# Patient Record
Sex: FEMALE | Race: WHITE | NOT HISPANIC OR LATINO | Employment: OTHER | URBAN - METROPOLITAN AREA
[De-identification: names, ages, dates, MRNs, and addresses within clinical notes are randomized per-mention and may not be internally consistent; named-entity substitution may affect disease eponyms.]

---

## 2017-03-21 ENCOUNTER — HOSPITAL ENCOUNTER (OUTPATIENT)
Dept: RADIOLOGY | Facility: MEDICAL CENTER | Age: 67
End: 2017-03-21
Attending: FAMILY MEDICINE
Payer: MEDICARE

## 2017-03-21 DIAGNOSIS — Z12.31 SCREENING MAMMOGRAM, ENCOUNTER FOR: ICD-10-CM

## 2017-03-21 PROCEDURE — 77063 BREAST TOMOSYNTHESIS BI: CPT

## 2017-03-23 ENCOUNTER — HOSPITAL ENCOUNTER (OUTPATIENT)
Dept: LAB | Facility: MEDICAL CENTER | Age: 67
End: 2017-03-23
Attending: FAMILY MEDICINE
Payer: MEDICARE

## 2017-03-23 LAB
ALBUMIN SERPL BCP-MCNC: 4.2 G/DL (ref 3.2–4.9)
ALBUMIN/GLOB SERPL: 1.4 G/DL
ALP SERPL-CCNC: 64 U/L (ref 30–99)
ALT SERPL-CCNC: 15 U/L (ref 2–50)
ANION GAP SERPL CALC-SCNC: 9 MMOL/L (ref 0–11.9)
APPEARANCE UR: CLEAR
AST SERPL-CCNC: 21 U/L (ref 12–45)
BASOPHILS # BLD AUTO: 0.08 K/UL (ref 0–0.12)
BASOPHILS NFR BLD AUTO: 1.1 % (ref 0–1.8)
BILIRUB SERPL-MCNC: 0.5 MG/DL (ref 0.1–1.5)
BILIRUB UR QL STRIP.AUTO: NEGATIVE
BUN SERPL-MCNC: 27 MG/DL (ref 8–22)
CALCIUM SERPL-MCNC: 9.8 MG/DL (ref 8.5–10.5)
CHLORIDE SERPL-SCNC: 107 MMOL/L (ref 96–112)
CHOLEST SERPL-MCNC: 159 MG/DL (ref 100–199)
CO2 SERPL-SCNC: 22 MMOL/L (ref 20–33)
COLOR UR AUTO: NORMAL
CREAT SERPL-MCNC: 0.88 MG/DL (ref 0.5–1.4)
EOSINOPHIL # BLD: 0.45 K/UL (ref 0–0.51)
EOSINOPHIL NFR BLD AUTO: 6.2 % (ref 0–6.9)
ERYTHROCYTE [DISTWIDTH] IN BLOOD BY AUTOMATED COUNT: 48.4 FL (ref 35.9–50)
EST. AVERAGE GLUCOSE BLD GHB EST-MCNC: 157 MG/DL
GLOBULIN SER CALC-MCNC: 3.1 G/DL (ref 1.9–3.5)
GLUCOSE SERPL-MCNC: 86 MG/DL (ref 65–99)
GLUCOSE UR STRIP.AUTO-MCNC: NEGATIVE MG/DL
HBA1C MFR BLD: 7.1 % (ref 0–5.6)
HCT VFR BLD AUTO: 42.3 % (ref 37–47)
HDLC SERPL-MCNC: 46 MG/DL
HGB BLD-MCNC: 13.4 G/DL (ref 12–16)
IMM GRANULOCYTES # BLD AUTO: 0.04 K/UL (ref 0–0.11)
IMM GRANULOCYTES NFR BLD AUTO: 0.5 % (ref 0–0.9)
KETONES UR STRIP.AUTO-MCNC: NEGATIVE MG/DL
LDLC SERPL CALC-MCNC: 85 MG/DL
LEUKOCYTE ESTERASE UR QL STRIP.AUTO: NEGATIVE
LYMPHOCYTES # BLD: 1.94 K/UL (ref 1–4.8)
LYMPHOCYTES NFR BLD AUTO: 26.6 % (ref 22–41)
MCH RBC QN AUTO: 29.5 PG (ref 27–33)
MCHC RBC AUTO-ENTMCNC: 31.7 G/DL (ref 33.6–35)
MCV RBC AUTO: 93 FL (ref 81.4–97.8)
MICRO URNS: NORMAL
MONOCYTES # BLD: 0.51 K/UL (ref 0–0.85)
MONOCYTES NFR BLD AUTO: 7 % (ref 0–13.4)
NEUTROPHILS # BLD: 4.28 K/UL (ref 2–7.15)
NEUTROPHILS NFR BLD AUTO: 58.6 % (ref 44–72)
NITRITE UR QL STRIP.AUTO: NEGATIVE
NRBC # BLD AUTO: 0 K/UL
NRBC BLD-RTO: 0 /100 WBC
PH UR: 5.5 [PH]
PLATELET # BLD AUTO: 304 K/UL (ref 164–446)
PMV BLD AUTO: 10.4 FL (ref 9–12.9)
POTASSIUM SERPL-SCNC: 4.1 MMOL/L (ref 3.6–5.5)
PROT SERPL-MCNC: 7.3 G/DL (ref 6–8.2)
PROT UR QL STRIP: NEGATIVE MG/DL
RBC # BLD AUTO: 4.55 M/UL (ref 4.2–5.4)
RBC UR QL AUTO: NEGATIVE
SODIUM SERPL-SCNC: 138 MMOL/L (ref 135–145)
SP GR UR STRIP.AUTO: 1.01
T4 FREE SERPL-MCNC: 0.81 NG/DL (ref 0.53–1.43)
TRIGL SERPL-MCNC: 142 MG/DL (ref 0–149)
TSH SERPL DL<=0.005 MIU/L-ACNC: 3.12 UIU/ML (ref 0.3–3.7)
WBC # BLD AUTO: 7.3 K/UL (ref 4.8–10.8)

## 2017-03-23 PROCEDURE — 85025 COMPLETE CBC W/AUTO DIFF WBC: CPT

## 2017-03-23 PROCEDURE — 83036 HEMOGLOBIN GLYCOSYLATED A1C: CPT | Mod: GA

## 2017-03-23 PROCEDURE — 80061 LIPID PANEL: CPT

## 2017-03-23 PROCEDURE — 84439 ASSAY OF FREE THYROXINE: CPT

## 2017-03-23 PROCEDURE — 81003 URINALYSIS AUTO W/O SCOPE: CPT

## 2017-03-23 PROCEDURE — 84443 ASSAY THYROID STIM HORMONE: CPT

## 2017-03-23 PROCEDURE — 80053 COMPREHEN METABOLIC PANEL: CPT

## 2017-03-23 PROCEDURE — 36415 COLL VENOUS BLD VENIPUNCTURE: CPT

## 2017-12-10 ENCOUNTER — APPOINTMENT (OUTPATIENT)
Dept: RADIOLOGY | Facility: MEDICAL CENTER | Age: 67
End: 2017-12-10
Attending: EMERGENCY MEDICINE
Payer: MEDICARE

## 2017-12-10 ENCOUNTER — HOSPITAL ENCOUNTER (EMERGENCY)
Facility: MEDICAL CENTER | Age: 67
End: 2017-12-10
Attending: EMERGENCY MEDICINE
Payer: MEDICARE

## 2017-12-10 VITALS
WEIGHT: 230 LBS | TEMPERATURE: 97.3 F | BODY MASS INDEX: 31.15 KG/M2 | HEIGHT: 72 IN | DIASTOLIC BLOOD PRESSURE: 75 MMHG | OXYGEN SATURATION: 100 % | HEART RATE: 75 BPM | SYSTOLIC BLOOD PRESSURE: 140 MMHG | RESPIRATION RATE: 16 BRPM

## 2017-12-10 DIAGNOSIS — R42 VERTIGO: ICD-10-CM

## 2017-12-10 LAB
ALBUMIN SERPL BCP-MCNC: 4.2 G/DL (ref 3.2–4.9)
ALBUMIN/GLOB SERPL: 1.3 G/DL
ALP SERPL-CCNC: 61 U/L (ref 30–99)
ALT SERPL-CCNC: 11 U/L (ref 2–50)
ANION GAP SERPL CALC-SCNC: 10 MMOL/L (ref 0–11.9)
APTT PPP: 25.1 SEC (ref 24.7–36)
AST SERPL-CCNC: 17 U/L (ref 12–45)
BASOPHILS # BLD AUTO: 0.8 % (ref 0–1.8)
BASOPHILS # BLD: 0.06 K/UL (ref 0–0.12)
BILIRUB SERPL-MCNC: 0.8 MG/DL (ref 0.1–1.5)
BUN SERPL-MCNC: 21 MG/DL (ref 8–22)
CALCIUM SERPL-MCNC: 9.3 MG/DL (ref 8.4–10.2)
CHLORIDE SERPL-SCNC: 107 MMOL/L (ref 96–112)
CO2 SERPL-SCNC: 21 MMOL/L (ref 20–33)
CREAT SERPL-MCNC: 0.99 MG/DL (ref 0.5–1.4)
EOSINOPHIL # BLD AUTO: 0.16 K/UL (ref 0–0.51)
EOSINOPHIL NFR BLD: 2 % (ref 0–6.9)
ERYTHROCYTE [DISTWIDTH] IN BLOOD BY AUTOMATED COUNT: 45.1 FL (ref 35.9–50)
GFR SERPL CREATININE-BSD FRML MDRD: 56 ML/MIN/1.73 M 2
GLOBULIN SER CALC-MCNC: 3.2 G/DL (ref 1.9–3.5)
GLUCOSE SERPL-MCNC: 229 MG/DL (ref 65–99)
HCT VFR BLD AUTO: 40.5 % (ref 37–47)
HGB BLD-MCNC: 13.6 G/DL (ref 12–16)
IMM GRANULOCYTES # BLD AUTO: 0.03 K/UL (ref 0–0.11)
IMM GRANULOCYTES NFR BLD AUTO: 0.4 % (ref 0–0.9)
INR PPP: 0.96 (ref 0.87–1.13)
LYMPHOCYTES # BLD AUTO: 1.83 K/UL (ref 1–4.8)
LYMPHOCYTES NFR BLD: 23.4 % (ref 22–41)
MCH RBC QN AUTO: 30.1 PG (ref 27–33)
MCHC RBC AUTO-ENTMCNC: 33.6 G/DL (ref 33.6–35)
MCV RBC AUTO: 89.6 FL (ref 81.4–97.8)
MONOCYTES # BLD AUTO: 0.41 K/UL (ref 0–0.85)
MONOCYTES NFR BLD AUTO: 5.2 % (ref 0–13.4)
NEUTROPHILS # BLD AUTO: 5.34 K/UL (ref 2–7.15)
NEUTROPHILS NFR BLD: 68.2 % (ref 44–72)
NRBC # BLD AUTO: 0 K/UL
NRBC BLD AUTO-RTO: 0 /100 WBC
PLATELET # BLD AUTO: 260 K/UL (ref 164–446)
PMV BLD AUTO: 9.6 FL (ref 9–12.9)
POTASSIUM SERPL-SCNC: 4.1 MMOL/L (ref 3.6–5.5)
PROT SERPL-MCNC: 7.4 G/DL (ref 6–8.2)
PROTHROMBIN TIME: 12.4 SEC (ref 12–14.6)
RBC # BLD AUTO: 4.52 M/UL (ref 4.2–5.4)
SODIUM SERPL-SCNC: 138 MMOL/L (ref 135–145)
T4 FREE SERPL-MCNC: 1.05 NG/DL (ref 0.58–1.64)
TSH SERPL DL<=0.005 MIU/L-ACNC: 2.88 UIU/ML (ref 0.35–5.5)
WBC # BLD AUTO: 7.8 K/UL (ref 4.8–10.8)

## 2017-12-10 PROCEDURE — 85025 COMPLETE CBC W/AUTO DIFF WBC: CPT

## 2017-12-10 PROCEDURE — 84439 ASSAY OF FREE THYROXINE: CPT

## 2017-12-10 PROCEDURE — 96374 THER/PROPH/DIAG INJ IV PUSH: CPT

## 2017-12-10 PROCEDURE — 70553 MRI BRAIN STEM W/O & W/DYE: CPT

## 2017-12-10 PROCEDURE — 80053 COMPREHEN METABOLIC PANEL: CPT

## 2017-12-10 PROCEDURE — 99284 EMERGENCY DEPT VISIT MOD MDM: CPT

## 2017-12-10 PROCEDURE — 700111 HCHG RX REV CODE 636 W/ 250 OVERRIDE (IP): Performed by: EMERGENCY MEDICINE

## 2017-12-10 PROCEDURE — 96375 TX/PRO/DX INJ NEW DRUG ADDON: CPT

## 2017-12-10 PROCEDURE — 85730 THROMBOPLASTIN TIME PARTIAL: CPT

## 2017-12-10 PROCEDURE — 700117 HCHG RX CONTRAST REV CODE 255: Performed by: EMERGENCY MEDICINE

## 2017-12-10 PROCEDURE — 85610 PROTHROMBIN TIME: CPT

## 2017-12-10 PROCEDURE — 84443 ASSAY THYROID STIM HORMONE: CPT

## 2017-12-10 PROCEDURE — A9577 INJ MULTIHANCE: HCPCS | Performed by: EMERGENCY MEDICINE

## 2017-12-10 RX ORDER — DIAZEPAM 2 MG/1
2 TABLET ORAL EVERY 6 HOURS PRN
Qty: 20 TAB | Refills: 0 | Status: SHIPPED | OUTPATIENT
Start: 2017-12-10 | End: 2021-04-12

## 2017-12-10 RX ORDER — AZITHROMYCIN 250 MG/1
250-500 TABLET, FILM COATED ORAL DAILY
Status: SHIPPED | COMMUNITY
Start: 2017-12-08 | End: 2021-04-12

## 2017-12-10 RX ORDER — IBUPROFEN 200 MG
400 TABLET ORAL EVERY 6 HOURS PRN
Status: SHIPPED | COMMUNITY
End: 2021-04-12

## 2017-12-10 RX ORDER — GLIPIZIDE 10 MG/1
10 TABLET ORAL 2 TIMES DAILY
COMMUNITY

## 2017-12-10 RX ORDER — LEVOTHYROXINE SODIUM 0.1 MG/1
100 TABLET ORAL
COMMUNITY

## 2017-12-10 RX ORDER — ONDANSETRON 2 MG/ML
4 INJECTION INTRAMUSCULAR; INTRAVENOUS ONCE
Status: COMPLETED | OUTPATIENT
Start: 2017-12-10 | End: 2017-12-10

## 2017-12-10 RX ORDER — LORAZEPAM 2 MG/ML
0.5 INJECTION INTRAMUSCULAR ONCE
Status: COMPLETED | OUTPATIENT
Start: 2017-12-10 | End: 2017-12-10

## 2017-12-10 RX ORDER — MECLIZINE HYDROCHLORIDE 25 MG/1
25 TABLET ORAL 4 TIMES DAILY
Qty: 30 TAB | Refills: 0 | Status: SHIPPED | OUTPATIENT
Start: 2017-12-10 | End: 2021-04-12

## 2017-12-10 RX ORDER — INSULIN GLARGINE 100 [IU]/ML
30 INJECTION, SOLUTION SUBCUTANEOUS NIGHTLY
COMMUNITY

## 2017-12-10 RX ORDER — HYDROCHLOROTHIAZIDE 25 MG/1
12.5 TABLET ORAL
Status: SHIPPED | COMMUNITY
End: 2021-04-12

## 2017-12-10 RX ADMIN — LORAZEPAM 0.5 MG: 2 INJECTION INTRAMUSCULAR; INTRAVENOUS at 10:55

## 2017-12-10 RX ADMIN — ONDANSETRON 4 MG: 2 INJECTION INTRAMUSCULAR; INTRAVENOUS at 10:55

## 2017-12-10 RX ADMIN — GADOBENATE DIMEGLUMINE 10 ML: 529 INJECTION, SOLUTION INTRAVENOUS at 12:39

## 2017-12-10 ASSESSMENT — PAIN SCALES - GENERAL: PAINLEVEL_OUTOF10: 0

## 2017-12-10 NOTE — DISCHARGE INSTRUCTIONS
Benign Positional Vertigo  Vertigo means you feel like you or your surroundings are moving when they are not. Benign positional vertigo is the most common form of vertigo. Benign means that the cause of your condition is not serious. Benign positional vertigo is more common in older adults.  CAUSES   Benign positional vertigo is the result of an upset in the labyrinth system. This is an area in the middle ear that helps control your balance. This may be caused by a viral infection, head injury, or repetitive motion. However, often no specific cause is found.  SYMPTOMS   Symptoms of benign positional vertigo occur when you move your head or eyes in different directions. Some of the symptoms may include:  · Loss of balance and falls.  · Vomiting.  · Blurred vision.  · Dizziness.  · Nausea.  · Involuntary eye movements (nystagmus).  DIAGNOSIS   Benign positional vertigo is usually diagnosed by physical exam. If the specific cause of your benign positional vertigo is unknown, your caregiver may perform imaging tests, such as magnetic resonance imaging (MRI) or computed tomography (CT).  TREATMENT   Your caregiver may recommend movements or procedures to correct the benign positional vertigo. Medicines such as meclizine, benzodiazepines, and medicines for nausea may be used to treat your symptoms. In rare cases, if your symptoms are caused by certain conditions that affect the inner ear, you may need surgery.  HOME CARE INSTRUCTIONS   · Follow your caregiver's instructions.  · Move slowly. Do not make sudden body or head movements.  · Avoid driving.  · Avoid operating heavy machinery.  · Avoid performing any tasks that would be dangerous to you or others during a vertigo episode.  · Drink enough fluids to keep your urine clear or pale yellow.  SEEK IMMEDIATE MEDICAL CARE IF:   · You develop problems with walking, weakness, numbness, or using your arms, hands, or legs.  · You have difficulty speaking.  · You develop  severe headaches.  · Your nausea or vomiting continues or gets worse.  · You develop visual changes.  · Your family or friends notice any behavioral changes.  · Your condition gets worse.  · You have a fever.  · You develop a stiff neck or sensitivity to light.  MAKE SURE YOU:   · Understand these instructions.  · Will watch your condition.  · Will get help right away if you are not doing well or get worse.     This information is not intended to replace advice given to you by your health care provider. Make sure you discuss any questions you have with your health care provider.     Document Released: 09/25/2007 Document Revised: 03/11/2013 Document Reviewed: 04/11/2016  [employer] Patient Education ©2016 [employer]

## 2017-12-10 NOTE — ED PROVIDER NOTES
ED Provider Note    CHIEF COMPLAINT  Chief Complaint   Patient presents with   • Dizziness   • Ear Pain     left       HPI  Rosetta Larry is a 67 y.o. female who presentsFor evaluation of vertigo. The patient is here with her . Over the past 2 weeks she's been having intermittent episodes of vertigo. She was seen in [place] and had laboratory and CT which she states were all normal. Yesterday she was feeling quite a bit better. She has been taking Antivert. Last night she had return of her vertiginous symptoms that have not subsided. She states nothing seems to make them better or worse. She has not had any falls but states that she's had difficulty walking due to the vertigo. She has no numbness or weakness. She is also complaining of left ear pain which seems to be new. She's had no trauma. She's had no drainage. She's had no fevers. She states that she is also now having shaking of her hands.    REVIEW OF SYSTEMS  See HPI for further details. All other systems are negative.     PAST MEDICAL HISTORY  Past Medical History:   Diagnosis Date   • CATARACT    • Diabetes    • Glaucoma     possible   • HTN    • Idiopathic angioedema    • Kidney stone    • Thyroid disease     hypothyroid       FAMILY HISTORY  Family History   Problem Relation Age of Onset   • Diabetes     • Heart Disease     • Cancer     • Hypertension         SOCIAL HISTORY  Social History     Social History   • Marital status:      Spouse name: N/A   • Number of children: N/A   • Years of education: N/A     Social History Main Topics   • Smoking status: Former Smoker     Quit date: 1/1/1970   • Smokeless tobacco: Not on file      Comment: 40 years ago   • Alcohol use No      Comment: rare   • Drug use: No   • Sexual activity: Not on file     Other Topics Concern   • Not on file     Social History Narrative   • No narrative on file       SURGICAL HISTORY  Past Surgical History:   Procedure Laterality Date   • INCISION AND DRAINAGE  GENERAL  1/18/2012    Performed by DL AMARAL at SURGERY [facility]S   • MAMMOPLASTY REDUCTION  9/27/2011    Performed by DL AMARAL at SURGERY [facility] ORS   • CATARACT PHACO WITH IOL  2/23/2011    Performed by CHAKA ANAYA at SURGERY SAME DAY [facility] ORS   • CATARACT PHACO WITH IOL  2/9/2011    Performed by CHAKA ANAYA at SURGERY SAME DAY [facility] ORS   • OTHER  2004    right chest area debridement   • ABDOMINAL HYSTERECTOMY TOTAL     • ACHILLES TENDON REPAIR     • APPENDECTOMY     • LUMPECTOMY      left breast       CURRENT MEDICATIONS  Home Medications     Reviewed by Yelitza Smith R.N. (Registered Nurse) on 12/10/17 at [place]  Med List Status: Complete   Medication Last Dose Status   aspirin (ASA) 325 MG TABS 12/9/2017 Active   atorvastatin (LIPITOR) 10 MG TABS 12/9/2017 Active   AVAPRO PO 12/10/2017 Active   cyanocobalamine/fa/pyridoxine (FOLGARD RX 2.2, FOLCAPS) 2.2-25-0.5 MG Tab 12/9/2017 Active   GLIPIZIDE 12/10/2017 Active   hydrochlorothiazide (HYDRODIURIL) 25 MG Tab  Active   hydrocodone-acetaminophen (VICODIN) 5-500 MG TABS 1/16/2012 Active   LANTUS SC 12/9/2017 Active   latanoprost (XALATAN) 0.005 % SOLN 12/9/2017 Active   LEVOTHYROXINE SODIUM 12/10/2017 Active   NON SPECIFIED 12/9/2017 Active                ALLERGIES  Allergies   Allergen Reactions   • Percocet [Oxycodone-Acetaminophen]      Full body hives   • Keflex    • Lovaza [Omega-3-Acid Ethyl Esters] Diarrhea and Itching   • Naproxen    • Pcn [Penicillins]    • Travatan [Travoprost] Itching and Swelling       PHYSICAL EXAM  VITAL SIGNS: /75   Pulse 80   Temp 36.3 °C (97.3 °F)   Resp 16   Ht 1.829 m (6')   Wt 104.3 kg (230 lb)   SpO2 96%   BMI 31.19 kg/m²     Constitutional: Well developed, Well nourished,Quite anxious, Non-toxic appearance.   HENT: Normocephalic, Atraumatic. TMs are clear bilaterally. Oropharynx is clear.  Eyes: PERRL, EOMI, Conjunctiva normal, No discharge.   Neck: Normal range of  motion.  Cardiovascular: Normal heart rate, Normal rhythm, No murmurs, No rubs, No gallops.   Thorax & Lungs: Lungs clear to auscultation bilaterally without wheezes, rales or rhonchi. No respiratory distress.   Abdomen:  Soft, No tenderness, No masses, No pulsatile masses. No guarding and no rebound.  Skin: Warm, Dry.   Extremities:  No edema, No cyanosis. No calf tenderness or swelling.  Musculoskeletal: Good range of motion in all major joints.  Neurologic: Awake alert and oriented x 3. Cranial nerves II through XII are intact. Normal motor function, No focal deficits noted. She does appear to have bilateral hand tremors.      RADIOLOGY/PROCEDURES  MR-BRAIN-WITH & W/O   Final Result      MRI brain with contrast within normal limits            COURSE & MEDICAL DECISION MAKING  Pertinent Labs & Imaging studies reviewed. (See chart for details)  This is a 67-year-old here for evaluation of vertigo. She appears neurologically intact on exam. She is quite anxious and appears to be somewhat tremulous. Laboratories are obtained to include thyroid function studies which are normal. Chemistries are normal with the exception of a glucose of 229. INR is normal. CBC is normal. The patient is treated with Ativan here with improvement in her symptoms. An MRI of the brain with and without contrast is obtained. This is interpreted by the radiologist as being a normal study. Upon repeat evaluation the patient is resting comfortably. I discussed results of all the studies with the patient and her . Based on the history of somewhat intermittent episodes I suspect this is vertigo secondary to an endolymph stone. I will provide the patient with a prescription for Antivert as well as Valium. She will contact her ear nose throat doctor tomorrow I also requested she follow up with her primary care provider. They're given a discharge instruction sheet on benign positional vertigo. She is discharged home in stable condition in the  care of her .    FINAL IMPRESSION  1. Benign positional vertigo  2.   3.         Electronically signed by: Malvin Lowery, 12/10/2017 10:24 AM

## 2017-12-10 NOTE — ED NOTES
Iv placed ,  Labs drawn by lab . Pt medicated as directed by CARMEN carlin, poc update given to pt. Further orders and dispo pending at this time. No further questions from pt at this time

## 2017-12-10 NOTE — ED NOTES
Several episode of dizziness in the last 2 weeks, worked up at [facility] where she was told everything was normal, dx Vertigo. She has been taking Antivert. Here today for continued dizziness and shaking.

## 2018-04-09 ENCOUNTER — HOSPITAL ENCOUNTER (OUTPATIENT)
Dept: RADIOLOGY | Facility: MEDICAL CENTER | Age: 68
End: 2018-04-09
Attending: FAMILY MEDICINE
Payer: MEDICARE

## 2018-04-09 DIAGNOSIS — Z12.31 SCREENING MAMMOGRAM, ENCOUNTER FOR: ICD-10-CM

## 2018-04-09 PROCEDURE — 77067 SCR MAMMO BI INCL CAD: CPT

## 2018-06-22 ENCOUNTER — HOSPITAL ENCOUNTER (OUTPATIENT)
Dept: LAB | Facility: MEDICAL CENTER | Age: 68
End: 2018-06-22
Attending: FAMILY MEDICINE
Payer: MEDICARE

## 2018-06-22 LAB
ALBUMIN SERPL BCP-MCNC: 4.3 G/DL (ref 3.2–4.9)
ALBUMIN/GLOB SERPL: 1.5 G/DL
ALP SERPL-CCNC: 57 U/L (ref 30–99)
ALT SERPL-CCNC: 9 U/L (ref 2–50)
ANION GAP SERPL CALC-SCNC: 10 MMOL/L (ref 0–11.9)
AST SERPL-CCNC: 18 U/L (ref 12–45)
BASOPHILS # BLD AUTO: 0.7 % (ref 0–1.8)
BASOPHILS # BLD: 0.07 K/UL (ref 0–0.12)
BILIRUB SERPL-MCNC: 0.5 MG/DL (ref 0.1–1.5)
BUN SERPL-MCNC: 21 MG/DL (ref 8–22)
CALCIUM SERPL-MCNC: 9.2 MG/DL (ref 8.5–10.5)
CHLORIDE SERPL-SCNC: 108 MMOL/L (ref 96–112)
CHOLEST SERPL-MCNC: 129 MG/DL (ref 100–199)
CO2 SERPL-SCNC: 21 MMOL/L (ref 20–33)
CREAT SERPL-MCNC: 0.87 MG/DL (ref 0.5–1.4)
EOSINOPHIL # BLD AUTO: 0.19 K/UL (ref 0–0.51)
EOSINOPHIL NFR BLD: 1.8 % (ref 0–6.9)
ERYTHROCYTE [DISTWIDTH] IN BLOOD BY AUTOMATED COUNT: 48.5 FL (ref 35.9–50)
ERYTHROCYTE [SEDIMENTATION RATE] IN BLOOD BY WESTERGREN METHOD: 28 MM/HOUR (ref 0–30)
GLOBULIN SER CALC-MCNC: 2.9 G/DL (ref 1.9–3.5)
GLUCOSE SERPL-MCNC: 78 MG/DL (ref 65–99)
HCT VFR BLD AUTO: 41.5 % (ref 37–47)
HDLC SERPL-MCNC: 43 MG/DL
HGB BLD-MCNC: 13.7 G/DL (ref 12–16)
IMM GRANULOCYTES # BLD AUTO: 0.04 K/UL (ref 0–0.11)
IMM GRANULOCYTES NFR BLD AUTO: 0.4 % (ref 0–0.9)
LDLC SERPL CALC-MCNC: 57 MG/DL
LYMPHOCYTES # BLD AUTO: 1.81 K/UL (ref 1–4.8)
LYMPHOCYTES NFR BLD: 17.5 % (ref 22–41)
MCH RBC QN AUTO: 30.2 PG (ref 27–33)
MCHC RBC AUTO-ENTMCNC: 33 G/DL (ref 33.6–35)
MCV RBC AUTO: 91.6 FL (ref 81.4–97.8)
MONOCYTES # BLD AUTO: 0.53 K/UL (ref 0–0.85)
MONOCYTES NFR BLD AUTO: 5.1 % (ref 0–13.4)
NEUTROPHILS # BLD AUTO: 7.7 K/UL (ref 2–7.15)
NEUTROPHILS NFR BLD: 74.5 % (ref 44–72)
NRBC # BLD AUTO: 0 K/UL
NRBC BLD-RTO: 0 /100 WBC
PLATELET # BLD AUTO: 274 K/UL (ref 164–446)
PMV BLD AUTO: 10.5 FL (ref 9–12.9)
POTASSIUM SERPL-SCNC: 3.6 MMOL/L (ref 3.6–5.5)
PROT SERPL-MCNC: 7.2 G/DL (ref 6–8.2)
RBC # BLD AUTO: 4.53 M/UL (ref 4.2–5.4)
SODIUM SERPL-SCNC: 139 MMOL/L (ref 135–145)
TRIGL SERPL-MCNC: 144 MG/DL (ref 0–149)
URATE SERPL-MCNC: 7.7 MG/DL (ref 1.9–8.2)
WBC # BLD AUTO: 10.3 K/UL (ref 4.8–10.8)

## 2018-06-22 PROCEDURE — 85025 COMPLETE CBC W/AUTO DIFF WBC: CPT

## 2018-06-22 PROCEDURE — 36415 COLL VENOUS BLD VENIPUNCTURE: CPT

## 2018-06-22 PROCEDURE — 84550 ASSAY OF BLOOD/URIC ACID: CPT

## 2018-06-22 PROCEDURE — 85652 RBC SED RATE AUTOMATED: CPT

## 2018-06-22 PROCEDURE — 80061 LIPID PANEL: CPT

## 2018-06-22 PROCEDURE — 80053 COMPREHEN METABOLIC PANEL: CPT

## 2019-04-22 ENCOUNTER — OFFICE VISIT (OUTPATIENT)
Dept: URGENT CARE | Facility: PHYSICIAN GROUP | Age: 69
End: 2019-04-22
Payer: MEDICARE

## 2019-04-22 VITALS
BODY MASS INDEX: 31.15 KG/M2 | DIASTOLIC BLOOD PRESSURE: 82 MMHG | HEART RATE: 86 BPM | HEIGHT: 72 IN | SYSTOLIC BLOOD PRESSURE: 118 MMHG | TEMPERATURE: 97.9 F | WEIGHT: 230 LBS | OXYGEN SATURATION: 98 %

## 2019-04-22 DIAGNOSIS — L02.91 ABSCESS: ICD-10-CM

## 2019-04-22 PROCEDURE — 99204 OFFICE O/P NEW MOD 45 MIN: CPT | Performed by: FAMILY MEDICINE

## 2019-04-22 RX ORDER — SULFAMETHOXAZOLE AND TRIMETHOPRIM 800; 160 MG/1; MG/1
1 TABLET ORAL 2 TIMES DAILY
Qty: 20 TAB | Refills: 0 | Status: SHIPPED | OUTPATIENT
Start: 2019-04-22 | End: 2019-05-02

## 2019-04-22 ASSESSMENT — ENCOUNTER SYMPTOMS
DIZZINESS: 0
NAUSEA: 0
VOMITING: 0
MYALGIAS: 0
CHILLS: 0
SORE THROAT: 0
NUMBNESS: 0
FEVER: 0
EYE PAIN: 0
WEAKNESS: 0
SHORTNESS OF BREATH: 0

## 2019-04-22 NOTE — PROGRESS NOTES
Subjective:     Rosetta Larry is a 69 y.o. female who presents for Finger Pain (x4 days, Swollen R pinky finger, pustle, painful )       Hand Injury   This is a new problem. The current episode started in the past 7 days. The problem occurs constantly. The problem has been gradually worsening. Pertinent negatives include no chest pain, chills, fever, myalgias, nausea, numbness, rash, sore throat, vomiting or weakness.     Past Medical History:   Diagnosis Date   • CATARACT    • Diabetes    • Glaucoma     possible   • HTN    • Idiopathic angioedema    • Kidney stone    • Thyroid disease     hypothyroid     Past Surgical History:   Procedure Laterality Date   • INCISION AND DRAINAGE GENERAL  1/18/2012    Performed by DL AMARAL at SURGERY [facility] ORS   • MAMMOPLASTY REDUCTION  9/27/2011    Performed by DL AMARAL at SURGERY [facility] ORS   • CATARACT PHACO WITH IOL  2/23/2011    Performed by CHAKA ANAYA at SURGERY SAME DAY [facility] ORS   • CATARACT PHACO WITH IOL  2/9/2011    Performed by CHAKA ANAYA at SURGERY SAME DAY [facility] ORS   • OTHER  2004    right chest area debridement   • ABDOMINAL HYSTERECTOMY TOTAL     • ACHILLES TENDON REPAIR     • APPENDECTOMY     • LUMPECTOMY      left breast     Social History     Social History   • Marital status:      Spouse name: N/A   • Number of children: N/A   • Years of education: N/A     Occupational History   • Not on file.     Social History Main Topics   • Smoking status: Former Smoker     Quit date: 1/1/1970   • Smokeless tobacco: Never Used      Comment: 40 years ago   • Alcohol use No      Comment: rare   • Drug use: No   • Sexual activity: Not on file     Other Topics Concern   • Not on file     Social History Narrative   • No narrative on file      Family History   Problem Relation Age of Onset   • Diabetes Unknown    • Heart Disease Unknown    • Cancer Unknown    • Hypertension Unknown    • Stroke Neg Hx     Review of  "Systems   Constitutional: Negative for chills and fever.   HENT: Negative for sore throat.    Eyes: Negative for pain.   Respiratory: Negative for shortness of breath.    Cardiovascular: Negative for chest pain.   Gastrointestinal: Negative for nausea and vomiting.   Genitourinary: Negative for hematuria.   Musculoskeletal: Negative for myalgias.   Skin: Negative for rash.   Neurological: Negative for dizziness, weakness and numbness.     Allergies   Allergen Reactions   • Percocet [Oxycodone-Acetaminophen]      Full body hives   • Hydrocodone Itching   • Keflex Rash     \"Body rash\"   • Lovaza [Omega-3-Acid Ethyl Esters] Diarrhea and Itching   • Naproxen Rash     \"Body rash\"   • Pcn [Penicillins] Rash     \"Body rash\"   • Travatan [Travoprost] Itching and Swelling      Objective:   /82 (BP Location: Right arm, Patient Position: Sitting, BP Cuff Size: Adult)   Pulse 86   Temp 36.6 °C (97.9 °F) (Temporal)   Ht 1.829 m (6')   Wt 104.3 kg (230 lb)   SpO2 98%   BMI 31.19 kg/m²   Physical Exam   Constitutional: She is oriented to person, place, and time. She appears well-developed and well-nourished. No distress.   HENT:   Head: Normocephalic and atraumatic.   Eyes: Pupils are equal, round, and reactive to light. Conjunctivae and EOM are normal.   Cardiovascular: Normal rate and regular rhythm.    No murmur heard.  Pulmonary/Chest: Effort normal and breath sounds normal. No respiratory distress.   Abdominal: Soft. She exhibits no distension. There is no tenderness.   Neurological: She is alert and oriented to person, place, and time. She has normal reflexes. No sensory deficit.   Skin: Skin is warm and dry.        Psychiatric: She has a normal mood and affect.         Assessment/Plan:   Assessment    1. Abscess  - sulfamethoxazole-trimethoprim (BACTRIM DS) 800-160 MG tablet; Take 1 Tab by mouth 2 times a day for 10 days.  Dispense: 20 Tab; Refill: 0  Abscess lanced with a 16G needle with purulent material " expressed.   Differential diagnosis, natural history, supportive care, and indications for immediate follow-up discussed.

## 2019-04-23 ENCOUNTER — TELEPHONE (OUTPATIENT)
Dept: URGENT CARE | Facility: PHYSICIAN GROUP | Age: 69
End: 2019-04-23

## 2019-04-24 ENCOUNTER — TELEPHONE (OUTPATIENT)
Dept: URGENT CARE | Facility: PHYSICIAN GROUP | Age: 69
End: 2019-04-24

## 2019-04-24 RX ORDER — LEVOFLOXACIN 500 MG/1
500 TABLET, FILM COATED ORAL DAILY
Qty: 7 TAB | Refills: 0 | Status: SHIPPED | OUTPATIENT
Start: 2019-04-24 | End: 2021-04-12

## 2019-06-26 ENCOUNTER — HOSPITAL ENCOUNTER (OUTPATIENT)
Dept: LAB | Facility: MEDICAL CENTER | Age: 69
End: 2019-06-26
Attending: FAMILY MEDICINE
Payer: MEDICARE

## 2019-06-26 LAB
ALBUMIN SERPL BCP-MCNC: 4.3 G/DL (ref 3.2–4.9)
ALBUMIN/GLOB SERPL: 1.5 G/DL
ALP SERPL-CCNC: 63 U/L (ref 30–99)
ALT SERPL-CCNC: 10 U/L (ref 2–50)
ANION GAP SERPL CALC-SCNC: 11 MMOL/L (ref 0–11.9)
AST SERPL-CCNC: 18 U/L (ref 12–45)
BASOPHILS # BLD AUTO: 0.9 % (ref 0–1.8)
BASOPHILS # BLD: 0.07 K/UL (ref 0–0.12)
BILIRUB SERPL-MCNC: 0.6 MG/DL (ref 0.1–1.5)
BUN SERPL-MCNC: 26 MG/DL (ref 8–22)
CALCIUM SERPL-MCNC: 9.7 MG/DL (ref 8.5–10.5)
CHLORIDE SERPL-SCNC: 107 MMOL/L (ref 96–112)
CHOLEST SERPL-MCNC: 123 MG/DL (ref 100–199)
CO2 SERPL-SCNC: 24 MMOL/L (ref 20–33)
CREAT SERPL-MCNC: 0.87 MG/DL (ref 0.5–1.4)
EOSINOPHIL # BLD AUTO: 0.19 K/UL (ref 0–0.51)
EOSINOPHIL NFR BLD: 2.6 % (ref 0–6.9)
ERYTHROCYTE [DISTWIDTH] IN BLOOD BY AUTOMATED COUNT: 47.7 FL (ref 35.9–50)
EST. AVERAGE GLUCOSE BLD GHB EST-MCNC: 146 MG/DL
GLOBULIN SER CALC-MCNC: 2.8 G/DL (ref 1.9–3.5)
GLUCOSE SERPL-MCNC: 80 MG/DL (ref 65–99)
HBA1C MFR BLD: 6.7 % (ref 0–5.6)
HCT VFR BLD AUTO: 42.9 % (ref 37–47)
HDLC SERPL-MCNC: 44 MG/DL
HGB BLD-MCNC: 13.3 G/DL (ref 12–16)
IMM GRANULOCYTES # BLD AUTO: 0.02 K/UL (ref 0–0.11)
IMM GRANULOCYTES NFR BLD AUTO: 0.3 % (ref 0–0.9)
LDLC SERPL CALC-MCNC: 55 MG/DL
LYMPHOCYTES # BLD AUTO: 2.05 K/UL (ref 1–4.8)
LYMPHOCYTES NFR BLD: 27.6 % (ref 22–41)
MCH RBC QN AUTO: 28.7 PG (ref 27–33)
MCHC RBC AUTO-ENTMCNC: 31 G/DL (ref 33.6–35)
MCV RBC AUTO: 92.7 FL (ref 81.4–97.8)
MONOCYTES # BLD AUTO: 0.4 K/UL (ref 0–0.85)
MONOCYTES NFR BLD AUTO: 5.4 % (ref 0–13.4)
NEUTROPHILS # BLD AUTO: 4.7 K/UL (ref 2–7.15)
NEUTROPHILS NFR BLD: 63.2 % (ref 44–72)
NRBC # BLD AUTO: 0 K/UL
NRBC BLD-RTO: 0 /100 WBC
PLATELET # BLD AUTO: 257 K/UL (ref 164–446)
PMV BLD AUTO: 11.2 FL (ref 9–12.9)
POTASSIUM SERPL-SCNC: 3.8 MMOL/L (ref 3.6–5.5)
PROT SERPL-MCNC: 7.1 G/DL (ref 6–8.2)
RBC # BLD AUTO: 4.63 M/UL (ref 4.2–5.4)
SODIUM SERPL-SCNC: 142 MMOL/L (ref 135–145)
TRIGL SERPL-MCNC: 122 MG/DL (ref 0–149)
TSH SERPL DL<=0.005 MIU/L-ACNC: 3.32 UIU/ML (ref 0.38–5.33)
URATE SERPL-MCNC: 7.6 MG/DL (ref 1.9–8.2)
WBC # BLD AUTO: 7.4 K/UL (ref 4.8–10.8)

## 2019-06-26 PROCEDURE — 85025 COMPLETE CBC W/AUTO DIFF WBC: CPT

## 2019-06-26 PROCEDURE — 83036 HEMOGLOBIN GLYCOSYLATED A1C: CPT | Mod: GA

## 2019-06-26 PROCEDURE — 84443 ASSAY THYROID STIM HORMONE: CPT

## 2019-06-26 PROCEDURE — 36415 COLL VENOUS BLD VENIPUNCTURE: CPT

## 2019-06-26 PROCEDURE — 80061 LIPID PANEL: CPT

## 2019-06-26 PROCEDURE — 80053 COMPREHEN METABOLIC PANEL: CPT

## 2019-06-26 PROCEDURE — 84550 ASSAY OF BLOOD/URIC ACID: CPT

## 2019-10-30 ENCOUNTER — HOSPITAL ENCOUNTER (OUTPATIENT)
Dept: RADIOLOGY | Facility: MEDICAL CENTER | Age: 69
End: 2019-10-30
Attending: FAMILY MEDICINE
Payer: MEDICARE

## 2019-10-30 DIAGNOSIS — Z12.31 VISIT FOR SCREENING MAMMOGRAM: ICD-10-CM

## 2019-10-30 PROCEDURE — 77063 BREAST TOMOSYNTHESIS BI: CPT

## 2019-11-01 ENCOUNTER — HOSPITAL ENCOUNTER (OUTPATIENT)
Dept: RADIOLOGY | Facility: MEDICAL CENTER | Age: 69
End: 2019-11-01
Attending: FAMILY MEDICINE
Payer: MEDICARE

## 2019-11-01 DIAGNOSIS — R92.8 ABNORMAL MAMMOGRAM: ICD-10-CM

## 2019-11-01 PROCEDURE — 76642 ULTRASOUND BREAST LIMITED: CPT | Mod: RT

## 2019-11-01 PROCEDURE — G0279 TOMOSYNTHESIS, MAMMO: HCPCS | Mod: RT

## 2021-01-08 ENCOUNTER — HOSPITAL ENCOUNTER (OUTPATIENT)
Dept: RADIOLOGY | Facility: MEDICAL CENTER | Age: 71
End: 2021-01-08
Attending: FAMILY MEDICINE
Payer: MEDICARE

## 2021-01-08 DIAGNOSIS — N64.4 BREAST PAIN: ICD-10-CM

## 2021-01-08 PROCEDURE — G0279 TOMOSYNTHESIS, MAMMO: HCPCS

## 2021-01-08 PROCEDURE — 76642 ULTRASOUND BREAST LIMITED: CPT | Mod: RT

## 2021-01-15 ENCOUNTER — HOSPITAL ENCOUNTER (OUTPATIENT)
Dept: LAB | Facility: MEDICAL CENTER | Age: 71
End: 2021-01-15
Attending: FAMILY MEDICINE
Payer: MEDICARE

## 2021-01-15 LAB
BASOPHILS # BLD AUTO: 0.7 % (ref 0–1.8)
BASOPHILS # BLD: 0.05 K/UL (ref 0–0.12)
EOSINOPHIL # BLD AUTO: 0.14 K/UL (ref 0–0.51)
EOSINOPHIL NFR BLD: 2 % (ref 0–6.9)
ERYTHROCYTE [DISTWIDTH] IN BLOOD BY AUTOMATED COUNT: 46.3 FL (ref 35.9–50)
HCT VFR BLD AUTO: 44.4 % (ref 37–47)
HGB BLD-MCNC: 14.3 G/DL (ref 12–16)
IMM GRANULOCYTES # BLD AUTO: 0.02 K/UL (ref 0–0.11)
IMM GRANULOCYTES NFR BLD AUTO: 0.3 % (ref 0–0.9)
LYMPHOCYTES # BLD AUTO: 1.73 K/UL (ref 1–4.8)
LYMPHOCYTES NFR BLD: 24.1 % (ref 22–41)
MCH RBC QN AUTO: 30.5 PG (ref 27–33)
MCHC RBC AUTO-ENTMCNC: 32.2 G/DL (ref 33.6–35)
MCV RBC AUTO: 94.7 FL (ref 81.4–97.8)
MONOCYTES # BLD AUTO: 0.37 K/UL (ref 0–0.85)
MONOCYTES NFR BLD AUTO: 5.2 % (ref 0–13.4)
NEUTROPHILS # BLD AUTO: 4.86 K/UL (ref 2–7.15)
NEUTROPHILS NFR BLD: 67.7 % (ref 44–72)
NRBC # BLD AUTO: 0 K/UL
NRBC BLD-RTO: 0 /100 WBC
PLATELET # BLD AUTO: 268 K/UL (ref 164–446)
PMV BLD AUTO: 11 FL (ref 9–12.9)
RBC # BLD AUTO: 4.69 M/UL (ref 4.2–5.4)
WBC # BLD AUTO: 7.2 K/UL (ref 4.8–10.8)

## 2021-01-15 PROCEDURE — 80053 COMPREHEN METABOLIC PANEL: CPT

## 2021-01-15 PROCEDURE — 82306 VITAMIN D 25 HYDROXY: CPT

## 2021-01-15 PROCEDURE — 84550 ASSAY OF BLOOD/URIC ACID: CPT

## 2021-01-15 PROCEDURE — 84481 FREE ASSAY (FT-3): CPT

## 2021-01-15 PROCEDURE — 82043 UR ALBUMIN QUANTITATIVE: CPT

## 2021-01-15 PROCEDURE — 84443 ASSAY THYROID STIM HORMONE: CPT

## 2021-01-15 PROCEDURE — 82570 ASSAY OF URINE CREATININE: CPT

## 2021-01-15 PROCEDURE — 85025 COMPLETE CBC W/AUTO DIFF WBC: CPT

## 2021-01-15 PROCEDURE — 36415 COLL VENOUS BLD VENIPUNCTURE: CPT

## 2021-01-15 PROCEDURE — 80061 LIPID PANEL: CPT

## 2021-01-15 PROCEDURE — 84439 ASSAY OF FREE THYROXINE: CPT

## 2021-01-16 LAB
25(OH)D3 SERPL-MCNC: 23 NG/ML (ref 30–100)
ALBUMIN SERPL BCP-MCNC: 4.4 G/DL (ref 3.2–4.9)
ALBUMIN/GLOB SERPL: 1.7 G/DL
ALP SERPL-CCNC: 87 U/L (ref 30–99)
ALT SERPL-CCNC: 10 U/L (ref 2–50)
ANION GAP SERPL CALC-SCNC: 10 MMOL/L (ref 7–16)
AST SERPL-CCNC: 19 U/L (ref 12–45)
BILIRUB SERPL-MCNC: 0.4 MG/DL (ref 0.1–1.5)
BUN SERPL-MCNC: 15 MG/DL (ref 8–22)
CALCIUM SERPL-MCNC: 9.5 MG/DL (ref 8.5–10.5)
CHLORIDE SERPL-SCNC: 107 MMOL/L (ref 96–112)
CHOLEST SERPL-MCNC: 139 MG/DL (ref 100–199)
CO2 SERPL-SCNC: 22 MMOL/L (ref 20–33)
CREAT SERPL-MCNC: 0.78 MG/DL (ref 0.5–1.4)
CREAT UR-MCNC: 84.86 MG/DL
GLOBULIN SER CALC-MCNC: 2.6 G/DL (ref 1.9–3.5)
GLUCOSE SERPL-MCNC: 162 MG/DL (ref 65–99)
HDLC SERPL-MCNC: 50 MG/DL
LDLC SERPL CALC-MCNC: 66 MG/DL
MICROALBUMIN UR-MCNC: <1.2 MG/DL
MICROALBUMIN/CREAT UR: NORMAL MG/G (ref 0–30)
POTASSIUM SERPL-SCNC: 4.3 MMOL/L (ref 3.6–5.5)
PROT SERPL-MCNC: 7 G/DL (ref 6–8.2)
SODIUM SERPL-SCNC: 139 MMOL/L (ref 135–145)
T3FREE SERPL-MCNC: 2.93 PG/ML (ref 2–4.4)
T4 FREE SERPL-MCNC: 1.52 NG/DL (ref 0.93–1.7)
TRIGL SERPL-MCNC: 114 MG/DL (ref 0–149)
TSH SERPL DL<=0.005 MIU/L-ACNC: 2.36 UIU/ML (ref 0.38–5.33)
URATE SERPL-MCNC: 4.6 MG/DL (ref 1.9–8.2)

## 2021-04-12 ENCOUNTER — HOSPITAL ENCOUNTER (EMERGENCY)
Facility: MEDICAL CENTER | Age: 71
End: 2021-04-12
Attending: EMERGENCY MEDICINE
Payer: MEDICARE

## 2021-04-12 VITALS
HEIGHT: 72 IN | BODY MASS INDEX: 29.59 KG/M2 | HEART RATE: 75 BPM | OXYGEN SATURATION: 95 % | TEMPERATURE: 98 F | DIASTOLIC BLOOD PRESSURE: 62 MMHG | SYSTOLIC BLOOD PRESSURE: 128 MMHG | WEIGHT: 218.48 LBS | RESPIRATION RATE: 18 BRPM

## 2021-04-12 DIAGNOSIS — T78.40XA ALLERGIC REACTION, INITIAL ENCOUNTER: ICD-10-CM

## 2021-04-12 PROCEDURE — 99284 EMERGENCY DEPT VISIT MOD MDM: CPT

## 2021-04-12 PROCEDURE — 700102 HCHG RX REV CODE 250 W/ 637 OVERRIDE(OP): Performed by: EMERGENCY MEDICINE

## 2021-04-12 PROCEDURE — A9270 NON-COVERED ITEM OR SERVICE: HCPCS | Performed by: EMERGENCY MEDICINE

## 2021-04-12 RX ORDER — LANOLIN ALCOHOL/MO/W.PET/CERES
1000 CREAM (GRAM) TOPICAL DAILY
COMMUNITY

## 2021-04-12 RX ORDER — GABAPENTIN 100 MG/1
200 CAPSULE ORAL 3 TIMES DAILY
COMMUNITY

## 2021-04-12 RX ORDER — METHYLPREDNISOLONE 4 MG/1
TABLET ORAL
Qty: 1 EACH | Refills: 0 | Status: SHIPPED | OUTPATIENT
Start: 2021-04-12 | End: 2022-05-03

## 2021-04-12 RX ORDER — ELECTROLYTES/DEXTROSE
100 SOLUTION, ORAL ORAL DAILY
COMMUNITY

## 2021-04-12 RX ORDER — DEXAMETHASONE 4 MG/1
8 TABLET ORAL ONCE
Status: COMPLETED | OUTPATIENT
Start: 2021-04-12 | End: 2021-04-12

## 2021-04-12 RX ORDER — CLINDAMYCIN HYDROCHLORIDE 150 MG/1
150 CAPSULE ORAL 3 TIMES DAILY
Status: SHIPPED | COMMUNITY
End: 2022-05-03

## 2021-04-12 RX ADMIN — DEXAMETHASONE 8 MG: 4 TABLET ORAL at 14:35

## 2021-04-12 ASSESSMENT — FIBROSIS 4 INDEX: FIB4 SCORE: 1.59

## 2021-04-12 NOTE — ED TRIAGE NOTES
"Pt AAO Anxious No dyspnea Mild facial \" puffiness \" No oral mucosal swelling Was taking clindamycin for spider bite posterior LT leg  Actually saw spider  "

## 2021-04-12 NOTE — ED PROVIDER NOTES
ED Provider Note    CHIEF COMPLAINT  Chief Complaint   Patient presents with   • Rash     Allergic Rx to clindamycin Onset Sat  after 5 days  To [facility] Sun On  benadryl Did not fill her pepcid Worsening No SOB   • Facial Swelling     Very mild       HPI  Rosetta Larry is a 71 y.o. female who presents to the emergency department for persistent rash. Past medical history as documented below. Type II diabetic. She had recently seen her primary care physician due to a questionable spider bite on her left thigh. After using some new process she denies any significant change to the lesion and therefore started course of clindamycin. In the last 48 hours of her treatment course she developed a diffuse rash and did not complete her last dosing. Now over the last 48 to 72 hours she is had persistent rash. She got her [facility] yesterday which started her on Benadryl Pepcid but this is not significant change or rash and therefore she decided to come to the emergency department today for further evaluation. She otherwise feels well. No shortness of breath and wheezing. No abdominal pain. No diarrhea. No difficulty breathing or swallowing.    REVIEW OF SYSTEMS  See HPI for further details. All other systems are negative.     PAST MEDICAL HISTORY   has a past medical history of CATARACT, Diabetes, Glaucoma, HTN, Idiopathic angioedema, Kidney stone, and Thyroid disease.    SOCIAL HISTORY  Social History     Tobacco Use   • Smoking status: Former Smoker     Quit date: 1970     Years since quittin.3   • Smokeless tobacco: Never Used   • Tobacco comment: 40 years ago   Substance and Sexual Activity   • Alcohol use: No     Alcohol/week: 0.0 oz     Comment: rare   • Drug use: No   • Sexual activity: Not on file       SURGICAL HISTORY   has a past surgical history that includes appendectomy; lumpectomy; abdominal hysterectomy total; achilles tendon repair; cataract phaco with iol (2011); cataract phaco with iol  "(2/23/2011); other (2004); mammoplasty reduction (9/27/2011); and incision and drainage general (1/18/2012).    CURRENT MEDICATIONS  Home Medications     Reviewed by Esperanza Walsh (Pharmacy Tech) on 04/12/21 at 1413  Med List Status: Complete   Medication Last Dose Status   aspirin EC (ECOTRIN) 81 MG Tablet Delayed Response 4/11/2021 Active   atorvastatin (LIPITOR) 10 MG TABS 4/11/2021 Active   CINNAMON PO 4/12/2021 Active   clindamycin (CLEOCIN) 150 MG Cap 4/10/2021 Active   Cyanocobalamin (VITAMIN B-12) 1000 MCG Tab 4/12/2021 Active   gabapentin (NEURONTIN) 100 MG Cap 4/12/2021 Active   glipiZIDE (GLUCOTROL) 10 MG Tab 4/12/2021 Active   insulin glargine (LANTUS) 100 UNIT/ML Solution 4/11/2021 Active   latanoprost (XALATAN) 0.005 % SOLN 4/11/2021 Active   levothyroxine (SYNTHROID) 100 MCG Tab 4/12/2021 Active   Pyridoxine HCl (VITAMIN B6) 100 MG Tab 4/12/2021 Active                ALLERGIES  Allergies   Allergen Reactions   • Clindamycin Rash and Swelling     facial   • Sulfa Drugs    • Percocet [Oxycodone-Acetaminophen]      Full body hives   • Hydrocodone Itching   • Keflex Rash     \"Body rash\"   • Lovaza [Omega-3-Acid Ethyl Esters] Diarrhea and Itching   • Naproxen Rash     \"Body rash\"   • Pcn [Penicillins] Rash     \"Body rash\"   • Travatan [Travoprost] Itching and Swelling       PHYSICAL EXAM  VITAL SIGNS: BP (!) 163/85   Pulse (!) 101   Temp 36.8 °C (98.2 °F) (Temporal)   Resp 16   Ht 1.829 m (6')   Wt 99.1 kg (218 lb 7.6 oz)   SpO2 97%   BMI 29.63 kg/m²  @GINGER[[id number]::@  Pulse ox interpretation: I interpret this pulse ox as normal.  Constitutional: Alert in no apparent distress.  HENT: Normocephalic, Atraumatic, Bilateral external ears normal. Nose normal.   Eyes: Pupils are equal and reactive.   Heart: Regular rate and rythm, no murmurs.    Lungs: Clear to auscultation bilaterally.  Skin: Warm, Dry, diffuse macular popular rash from head to toe sparing palms and soles mucous " membranes.  Neurologic: Alert, Grossly non-focal.   Psychiatric: Affect normal, Judgment normal, Mood normal, Appears appropriate and not intoxicated.           COURSE & MEDICAL DECISION MAKING  Pertinent Labs & Imaging studies reviewed. (See chart for details)  71-year-old female presented emergency room with allergic reaction and rash. History as above. Overall the patient appears well. She has taken Benadryl Pepcid for the last 24 to 36 hours with no change. The client will started on steroids but she is understand that she will need to monitor her blood sugars carefully and may need additional coverage at night with her Lantus in the evenings. She is additionally understanding return precautions here the ER. She is understand that she should avoid further exposure to this medication as this does seem to be the causative factor. She denies any return precautions here the ER.       The patient will return for worsening symptoms and is stable at the time of discharge. The patient verbalizes understanding and will comply.    FINAL IMPRESSION  1. Allergic reaction, initial encounter               Electronically signed by: José Wyman M.D., 4/12/2021 2:30 PM

## 2021-04-12 NOTE — ED NOTES
Med rec completed per pt   Allergies reviewed    Pt was on a 7 day course of Clindamycin that she stopped on day 5 (4/10/2021) due to a reaction

## 2021-09-01 ENCOUNTER — HOSPITAL ENCOUNTER (OUTPATIENT)
Dept: LAB | Facility: MEDICAL CENTER | Age: 71
End: 2021-09-01
Attending: FAMILY MEDICINE
Payer: MEDICARE

## 2021-09-01 LAB
25(OH)D3 SERPL-MCNC: 18 NG/ML (ref 30–100)
ALBUMIN SERPL BCP-MCNC: 4.3 G/DL (ref 3.2–4.9)
ALBUMIN/GLOB SERPL: 1.5 G/DL
ALP SERPL-CCNC: 86 U/L (ref 30–99)
ALT SERPL-CCNC: 11 U/L (ref 2–50)
ANION GAP SERPL CALC-SCNC: 14 MMOL/L (ref 7–16)
AST SERPL-CCNC: 21 U/L (ref 12–45)
BASOPHILS # BLD AUTO: 0.7 % (ref 0–1.8)
BASOPHILS # BLD: 0.05 K/UL (ref 0–0.12)
BILIRUB SERPL-MCNC: 0.4 MG/DL (ref 0.1–1.5)
BUN SERPL-MCNC: 23 MG/DL (ref 8–22)
CALCIUM SERPL-MCNC: 9.5 MG/DL (ref 8.5–10.5)
CHLORIDE SERPL-SCNC: 107 MMOL/L (ref 96–112)
CHOLEST SERPL-MCNC: 165 MG/DL (ref 100–199)
CO2 SERPL-SCNC: 19 MMOL/L (ref 20–33)
CREAT SERPL-MCNC: 0.73 MG/DL (ref 0.5–1.4)
EOSINOPHIL # BLD AUTO: 0.2 K/UL (ref 0–0.51)
EOSINOPHIL NFR BLD: 3 % (ref 0–6.9)
ERYTHROCYTE [DISTWIDTH] IN BLOOD BY AUTOMATED COUNT: 46.2 FL (ref 35.9–50)
EST. AVERAGE GLUCOSE BLD GHB EST-MCNC: 163 MG/DL
GLOBULIN SER CALC-MCNC: 2.9 G/DL (ref 1.9–3.5)
GLUCOSE SERPL-MCNC: 76 MG/DL (ref 65–99)
HBA1C MFR BLD: 7.3 % (ref 4–5.6)
HCT VFR BLD AUTO: 43.5 % (ref 37–47)
HDLC SERPL-MCNC: 54 MG/DL
HGB BLD-MCNC: 14.2 G/DL (ref 12–16)
IMM GRANULOCYTES # BLD AUTO: 0.04 K/UL (ref 0–0.11)
IMM GRANULOCYTES NFR BLD AUTO: 0.6 % (ref 0–0.9)
LDLC SERPL CALC-MCNC: 90 MG/DL
LYMPHOCYTES # BLD AUTO: 2.39 K/UL (ref 1–4.8)
LYMPHOCYTES NFR BLD: 35.3 % (ref 22–41)
MCH RBC QN AUTO: 30 PG (ref 27–33)
MCHC RBC AUTO-ENTMCNC: 32.6 G/DL (ref 33.6–35)
MCV RBC AUTO: 92 FL (ref 81.4–97.8)
MONOCYTES # BLD AUTO: 0.47 K/UL (ref 0–0.85)
MONOCYTES NFR BLD AUTO: 6.9 % (ref 0–13.4)
NEUTROPHILS # BLD AUTO: 3.62 K/UL (ref 2–7.15)
NEUTROPHILS NFR BLD: 53.5 % (ref 44–72)
NRBC # BLD AUTO: 0 K/UL
NRBC BLD-RTO: 0 /100 WBC
PLATELET # BLD AUTO: 262 K/UL (ref 164–446)
PMV BLD AUTO: 11 FL (ref 9–12.9)
POTASSIUM SERPL-SCNC: 4 MMOL/L (ref 3.6–5.5)
PROT SERPL-MCNC: 7.2 G/DL (ref 6–8.2)
RBC # BLD AUTO: 4.73 M/UL (ref 4.2–5.4)
SODIUM SERPL-SCNC: 140 MMOL/L (ref 135–145)
T3FREE SERPL-MCNC: 2.62 PG/ML (ref 2–4.4)
T4 FREE SERPL-MCNC: 1.36 NG/DL (ref 0.93–1.7)
TRIGL SERPL-MCNC: 106 MG/DL (ref 0–149)
TSH SERPL DL<=0.005 MIU/L-ACNC: 2.58 UIU/ML (ref 0.38–5.33)
URATE SERPL-MCNC: 5.9 MG/DL (ref 1.9–8.2)
WBC # BLD AUTO: 6.8 K/UL (ref 4.8–10.8)

## 2021-09-01 PROCEDURE — 84481 FREE ASSAY (FT-3): CPT

## 2021-09-01 PROCEDURE — 36415 COLL VENOUS BLD VENIPUNCTURE: CPT | Mod: GA

## 2021-09-01 PROCEDURE — 84443 ASSAY THYROID STIM HORMONE: CPT

## 2021-09-01 PROCEDURE — 80061 LIPID PANEL: CPT

## 2021-09-01 PROCEDURE — 82043 UR ALBUMIN QUANTITATIVE: CPT

## 2021-09-01 PROCEDURE — 83036 HEMOGLOBIN GLYCOSYLATED A1C: CPT | Mod: GA

## 2021-09-01 PROCEDURE — 84439 ASSAY OF FREE THYROXINE: CPT

## 2021-09-01 PROCEDURE — 82306 VITAMIN D 25 HYDROXY: CPT | Mod: GA

## 2021-09-01 PROCEDURE — 84550 ASSAY OF BLOOD/URIC ACID: CPT

## 2021-09-01 PROCEDURE — 85025 COMPLETE CBC W/AUTO DIFF WBC: CPT

## 2021-09-01 PROCEDURE — 80053 COMPREHEN METABOLIC PANEL: CPT

## 2021-09-01 PROCEDURE — 82570 ASSAY OF URINE CREATININE: CPT

## 2021-09-02 LAB
CREAT UR-MCNC: 70.05 MG/DL
MICROALBUMIN UR-MCNC: <1.2 MG/DL
MICROALBUMIN/CREAT UR: NORMAL MG/G (ref 0–30)

## 2021-11-08 ENCOUNTER — HOSPITAL ENCOUNTER (OUTPATIENT)
Dept: LAB | Facility: MEDICAL CENTER | Age: 71
End: 2021-11-08
Attending: FAMILY MEDICINE
Payer: MEDICARE

## 2021-11-08 PROCEDURE — 86769 SARS-COV-2 COVID-19 ANTIBODY: CPT

## 2021-11-08 PROCEDURE — 36415 COLL VENOUS BLD VENIPUNCTURE: CPT

## 2021-11-10 LAB
SARS-COV-2 IGG SERPL IA-ACNC: <1 IV
SARS-COV-2 IGG SERPL QL IA: NEGATIVE

## 2022-05-03 ENCOUNTER — PRE-ADMISSION TESTING (OUTPATIENT)
Dept: ADMISSIONS | Facility: MEDICAL CENTER | Age: 72
End: 2022-05-03
Attending: PODIATRIST
Payer: MEDICARE

## 2022-05-05 ENCOUNTER — PRE-ADMISSION TESTING (OUTPATIENT)
Dept: ADMISSIONS | Facility: MEDICAL CENTER | Age: 72
End: 2022-05-05
Attending: PODIATRIST
Payer: MEDICARE

## 2022-05-05 DIAGNOSIS — Z01.812 PRE-OPERATIVE LABORATORY EXAMINATION: ICD-10-CM

## 2022-05-05 LAB
ANION GAP SERPL CALC-SCNC: 12 MMOL/L (ref 7–16)
BUN SERPL-MCNC: 28 MG/DL (ref 8–22)
CALCIUM SERPL-MCNC: 9.1 MG/DL (ref 8.4–10.2)
CHLORIDE SERPL-SCNC: 107 MMOL/L (ref 96–112)
CO2 SERPL-SCNC: 19 MMOL/L (ref 20–33)
CREAT SERPL-MCNC: 0.77 MG/DL (ref 0.5–1.4)
GFR SERPLBLD CREATININE-BSD FMLA CKD-EPI: 82 ML/MIN/1.73 M 2
GLUCOSE SERPL-MCNC: 143 MG/DL (ref 65–99)
POTASSIUM SERPL-SCNC: 4.7 MMOL/L (ref 3.6–5.5)
SODIUM SERPL-SCNC: 138 MMOL/L (ref 135–145)

## 2022-05-05 PROCEDURE — 36415 COLL VENOUS BLD VENIPUNCTURE: CPT

## 2022-05-05 PROCEDURE — 80048 BASIC METABOLIC PNL TOTAL CA: CPT

## 2022-05-20 ENCOUNTER — HOSPITAL ENCOUNTER (OUTPATIENT)
Facility: MEDICAL CENTER | Age: 72
End: 2022-05-20
Attending: PODIATRIST | Admitting: PODIATRIST
Payer: MEDICARE

## 2022-05-20 ENCOUNTER — ANESTHESIA (OUTPATIENT)
Dept: SURGERY | Facility: MEDICAL CENTER | Age: 72
End: 2022-05-20
Payer: MEDICARE

## 2022-05-20 ENCOUNTER — ANESTHESIA EVENT (OUTPATIENT)
Dept: SURGERY | Facility: MEDICAL CENTER | Age: 72
End: 2022-05-20
Payer: MEDICARE

## 2022-05-20 ENCOUNTER — APPOINTMENT (OUTPATIENT)
Dept: RADIOLOGY | Facility: MEDICAL CENTER | Age: 72
End: 2022-05-20
Attending: PODIATRIST
Payer: MEDICARE

## 2022-05-20 VITALS
WEIGHT: 218.48 LBS | TEMPERATURE: 97.5 F | RESPIRATION RATE: 15 BRPM | BODY MASS INDEX: 29.59 KG/M2 | SYSTOLIC BLOOD PRESSURE: 159 MMHG | HEIGHT: 72 IN | OXYGEN SATURATION: 99 % | DIASTOLIC BLOOD PRESSURE: 74 MMHG | HEART RATE: 66 BPM

## 2022-05-20 LAB — GLUCOSE BLD STRIP.AUTO-MCNC: 102 MG/DL (ref 65–99)

## 2022-05-20 PROCEDURE — 160028 HCHG SURGERY MINUTES - 1ST 30 MINS LEVEL 3: Performed by: PODIATRIST

## 2022-05-20 PROCEDURE — 82962 GLUCOSE BLOOD TEST: CPT

## 2022-05-20 PROCEDURE — 01480 ANES OPEN PX LOWER L/A/F NOS: CPT | Performed by: ANESTHESIOLOGY

## 2022-05-20 PROCEDURE — 160046 HCHG PACU - 1ST 60 MINS PHASE II: Performed by: PODIATRIST

## 2022-05-20 PROCEDURE — 160025 RECOVERY II MINUTES (STATS): Performed by: PODIATRIST

## 2022-05-20 PROCEDURE — 160035 HCHG PACU - 1ST 60 MINS PHASE I: Performed by: PODIATRIST

## 2022-05-20 PROCEDURE — 700101 HCHG RX REV CODE 250: Performed by: PODIATRIST

## 2022-05-20 PROCEDURE — 501838 HCHG SUTURE GENERAL: Performed by: PODIATRIST

## 2022-05-20 PROCEDURE — 160009 HCHG ANES TIME/MIN: Performed by: PODIATRIST

## 2022-05-20 PROCEDURE — 160002 HCHG RECOVERY MINUTES (STAT): Performed by: PODIATRIST

## 2022-05-20 PROCEDURE — 160039 HCHG SURGERY MINUTES - EA ADDL 1 MIN LEVEL 3: Performed by: PODIATRIST

## 2022-05-20 PROCEDURE — 160048 HCHG OR STATISTICAL LEVEL 1-5: Performed by: PODIATRIST

## 2022-05-20 PROCEDURE — 73630 X-RAY EXAM OF FOOT: CPT | Mod: LT

## 2022-05-20 PROCEDURE — 700111 HCHG RX REV CODE 636 W/ 250 OVERRIDE (IP): Performed by: ANESTHESIOLOGY

## 2022-05-20 PROCEDURE — 99100 ANES PT EXTEME AGE<1 YR&>70: CPT | Performed by: ANESTHESIOLOGY

## 2022-05-20 PROCEDURE — 700101 HCHG RX REV CODE 250: Performed by: ANESTHESIOLOGY

## 2022-05-20 PROCEDURE — 160036 HCHG PACU - EA ADDL 30 MINS PHASE I: Performed by: PODIATRIST

## 2022-05-20 RX ORDER — DIPHENHYDRAMINE HYDROCHLORIDE 50 MG/ML
12.5 INJECTION INTRAMUSCULAR; INTRAVENOUS
Status: DISCONTINUED | OUTPATIENT
Start: 2022-05-20 | End: 2022-05-24 | Stop reason: HOSPADM

## 2022-05-20 RX ORDER — BUPIVACAINE HYDROCHLORIDE 5 MG/ML
INJECTION, SOLUTION EPIDURAL; INTRACAUDAL
Status: DISCONTINUED | OUTPATIENT
Start: 2022-05-20 | End: 2022-05-20 | Stop reason: HOSPADM

## 2022-05-20 RX ORDER — HALOPERIDOL 5 MG/ML
1 INJECTION INTRAMUSCULAR
Status: DISCONTINUED | OUTPATIENT
Start: 2022-05-20 | End: 2022-05-24 | Stop reason: HOSPADM

## 2022-05-20 RX ORDER — MEPERIDINE HYDROCHLORIDE 25 MG/ML
6.25 INJECTION INTRAMUSCULAR; INTRAVENOUS; SUBCUTANEOUS
Status: DISCONTINUED | OUTPATIENT
Start: 2022-05-20 | End: 2022-05-24 | Stop reason: HOSPADM

## 2022-05-20 RX ORDER — LIDOCAINE HYDROCHLORIDE 10 MG/ML
INJECTION, SOLUTION INFILTRATION; PERINEURAL
Status: DISCONTINUED | OUTPATIENT
Start: 2022-05-20 | End: 2022-05-20 | Stop reason: HOSPADM

## 2022-05-20 RX ORDER — SODIUM CHLORIDE, SODIUM LACTATE, POTASSIUM CHLORIDE, CALCIUM CHLORIDE 600; 310; 30; 20 MG/100ML; MG/100ML; MG/100ML; MG/100ML
INJECTION, SOLUTION INTRAVENOUS CONTINUOUS
Status: DISCONTINUED | OUTPATIENT
Start: 2022-05-20 | End: 2022-05-24 | Stop reason: HOSPADM

## 2022-05-20 RX ORDER — SODIUM CHLORIDE, SODIUM LACTATE, POTASSIUM CHLORIDE, CALCIUM CHLORIDE 600; 310; 30; 20 MG/100ML; MG/100ML; MG/100ML; MG/100ML
INJECTION, SOLUTION INTRAVENOUS CONTINUOUS
Status: ACTIVE | OUTPATIENT
Start: 2022-05-20 | End: 2022-05-20

## 2022-05-20 RX ORDER — ONDANSETRON 2 MG/ML
4 INJECTION INTRAMUSCULAR; INTRAVENOUS
Status: DISCONTINUED | OUTPATIENT
Start: 2022-05-20 | End: 2022-05-24 | Stop reason: HOSPADM

## 2022-05-20 RX ORDER — LIDOCAINE HYDROCHLORIDE 20 MG/ML
INJECTION, SOLUTION EPIDURAL; INFILTRATION; INTRACAUDAL; PERINEURAL PRN
Status: DISCONTINUED | OUTPATIENT
Start: 2022-05-20 | End: 2022-05-20 | Stop reason: SURG

## 2022-05-20 RX ORDER — CEFAZOLIN SODIUM 1 G/3ML
INJECTION, POWDER, FOR SOLUTION INTRAMUSCULAR; INTRAVENOUS PRN
Status: DISCONTINUED | OUTPATIENT
Start: 2022-05-20 | End: 2022-05-20 | Stop reason: SURG

## 2022-05-20 RX ADMIN — PROPOFOL 200 MG: 10 INJECTION, EMULSION INTRAVENOUS at 13:09

## 2022-05-20 RX ADMIN — LIDOCAINE HYDROCHLORIDE 100 MG: 20 INJECTION, SOLUTION EPIDURAL; INFILTRATION; INTRACAUDAL; PERINEURAL at 13:09

## 2022-05-20 RX ADMIN — CEFAZOLIN 2 G: 330 INJECTION, POWDER, FOR SOLUTION INTRAMUSCULAR; INTRAVENOUS at 13:13

## 2022-05-20 ASSESSMENT — PAIN DESCRIPTION - PAIN TYPE
TYPE: SURGICAL PAIN

## 2022-05-20 ASSESSMENT — FIBROSIS 4 INDEX: FIB4 SCORE: 1.74

## 2022-05-20 NOTE — OR NURSING
Pt brought back to PreOp, RN assumed care, All PreOp tasks complete-see Epic for further details, PreOP FSBS 102

## 2022-05-20 NOTE — OR NURSING
1442 Report received from Briana ROSSI.     1444 Xray paged to bedside.     1508 Xray called.     1515 Xray done at bedside.     1550 Dr. Fernandez notified patients HR drops briefly to 46. HR range 46-64. Patient asymptomatic. Rhythm: First degree AVB, MD aware. Dr. Fernandez stated pt ok to transfer to stage II.      1557 Report given to Alondra ROSSI.     1618 Pt met criteria for transfer to stage II via gurney with CNA assist. Pt states good pain control. Tolerating water well, no c/o n/v. Dressing to LEFT foot CDI. Pink toes, <3 sec cap refill. Surgical shoe in place. Report given to Alondra ROSSI.

## 2022-05-20 NOTE — OR SURGEON
Immediate Post OP Note    PreOp Diagnosis: Hammertoe 1st digit left foot.   Hammertoe 5th digit left foot      PostOp Diagnosis: same      Procedure(s):  CORRECTION, HAMMER TOE - FIRST AND FIFTH DIGITS, FOOT; - Wound Class: Clean    Surgeon(s):  Hammad Sierra D.P.M.    Anesthesiologist/Type of Anesthesia:  Anesthesiologist: Lucio Fernandez M.D./General    Surgical Staff:  Circulator: Elena Au R.N.  Relief Scrub: Devante Sparks  Scrub Person: Margaret Nicole    Specimens removed if any:  * No specimens in log *    Estimated Blood Loss: 3cc    Findings: Hammertoes 1,5 digits left foot    Complications: none        5/20/2022 2:01 PM Hammad Sierra D.P.M.

## 2022-05-20 NOTE — DISCHARGE INSTRUCTIONS
ACTIVITY: Rest and take it easy for the first 24 hours.  A responsible adult is recommended to remain with you during that time.  It is normal to feel sleepy.  We encourage you to not do anything that requires balance, judgment or coordination.    MILD FLU-LIKE SYMPTOMS ARE NORMAL. YOU MAY EXPERIENCE GENERALIZED MUSCLE ACHES, THROAT IRRITATION, HEADACHE AND/OR SOME NAUSEA.    FOR 24 HOURS DO NOT:  Drive, operate machinery or run household appliances.  Drink beer or alcoholic beverages.   Make important decisions or sign legal documents.    SPECIAL INSTRUCTIONS: ACTIVITY AS TOLERATED. ICE AND ELEVATE OPERATIVE FOOT.     DIET: To avoid nausea, slowly advance diet as tolerated, avoiding spicy or greasy foods for the first day.  Add more substantial food to your diet according to your physician's instructions. INCREASE FLUIDS AND FIBER TO AVOID CONSTIPATION.    SURGICAL DRESSING/BATHING: KEEP DRESSING CLEAN AND DRY, AS DIRECTED BY MD.     FOLLOW-UP APPOINTMENT:  A follow-up appointment should be arranged with your doctor AS SCHEDULED; call to schedule.    You should CALL YOUR PHYSICIAN if you develop:  Fever greater than 101 degrees F.  Pain not relieved by medication, or persistent nausea or vomiting.  Excessive bleeding (blood soaking through dressing) or unexpected drainage from the wound.  Extreme redness or swelling around the incision site, drainage of pus or foul smelling drainage.  Inability to urinate or empty your bladder within 8 hours.  Problems with breathing or chest pain.    You should call 911 if you develop problems with breathing or chest pain.  If you are unable to contact your doctor or surgical center, you should go to the nearest emergency room or urgent care center.  Physician's telephone #: [phone]    If any questions arise, call your doctor.  If your doctor is not available, please feel free to call the Surgical Center at [phone].     A registered nurse may call you a few days after  your surgery to see how you are doing after your procedure.    MEDICATIONS: Resume taking daily medication.  Take prescribed pain medication with food.  If no medication is prescribed, you may take non-aspirin pain medication if needed.  PAIN MEDICATION CAN BE VERY CONSTIPATING.  Take a stool softener or laxative such as senokot, pericolace, or milk of magnesia if needed.    Prescription given for .  Last pain medication given at .    If your physician has prescribed pain medication that includes Acetaminophen (Tylenol), do not take additional Acetaminophen (Tylenol) while taking the prescribed medication.    Depression / Suicide Risk    As you are discharged from this [facility], it is important to learn how to keep safe from harming yourself.    Recognize the warning signs:  Abrupt changes in personality, positive or negative- including increase in energy   Giving away possessions  Change in eating patterns- significant weight changes-  positive or negative  Change in sleeping patterns- unable to sleep or sleeping all the time   Unwillingness or inability to communicate  Depression  Unusual sadness, discouragement and loneliness  Talk of wanting to die  Neglect of personal appearance   Rebelliousness- reckless behavior  Withdrawal from people/activities they love  Confusion- inability to concentrate     If you or a loved one observes any of these behaviors or has concerns about self-harm, here's what you can do:  Talk about it- your feelings and reasons for harming yourself  Remove any means that you might use to hurt yourself (examples: pills, rope, extension cords, firearm)  Get professional help from the community (Mental Health, Substance Abuse, psychological counseling)  Do not be alone:Call your Safe Contact- someone whom you trust who will be there for you.  Call your local CRISIS HOTLINE 519-9173 or 971-428-1142  Call your local Children's Mobile Crisis Response Team [facility] (405)  850-1521 or www.[employer]  Call the toll free National Suicide Prevention Hotlines   National Suicide Prevention Lifeline 189-421-TEOB (9424)  [employer] Line Network 800-SUICIDE (363-5889)

## 2022-05-20 NOTE — ANESTHESIA TIME REPORT
Anesthesia Start and Stop Event Times     Date Time Event    5/20/2022 1245 Ready for Procedure     1309 Anesthesia Start     1419 Anesthesia Stop        Responsible Staff  05/20/22    Name Role Begin End    Lucio Fernandez M.D. Anesth 1309 1419        Overtime Reason:  no overtime (within assigned shift)    Comments:

## 2022-05-20 NOTE — ANESTHESIA PROCEDURE NOTES
Airway    Date/Time: 5/20/2022 1:14 PM  Performed by: Lucio Fernandez M.D.  Authorized by: Lucio Fernandez M.D.     Location:  OR  Urgency:  Elective  Indications for Airway Management:  Anesthesia      Spontaneous Ventilation: absent    Sedation Level:  Deep  Preoxygenated: Yes    Final Airway Type:  Supraglottic airway  Final Supraglottic Airway:  Standard LMA    SGA Size:  3  Number of Attempts at Approach:  1

## 2022-05-20 NOTE — OP REPORT
DATE OF SERVICE:  05/20/2022     SURGEON:  Hammad Sierra DPM     ASSISTANT:  None.     ANESTHESIOLOGIST:  Lucio Fernandez MD     ANESTHESIA UTILIZED:  General anesthesia with local Morales block was performed   at and above the first and fifth digits of the left foot with 20 mL of 50:50   mixture of 1% lidocaine plain and 0.5% Marcaine plain. For rest of the   anesthesia detail, please refer to anesthesiologist's notes.     PREOPERATIVE DIAGNOSIS:  1.  Hammertoe, first digit, left foot.  2.  Hammertoe, fifth digit, left foot.     POSTOPERATIVE DIAGNOSES:  1.  Hammertoe, first digit, left foot.  2.  Hammertoe, fifth digit, left foot.     PROCEDURES:  1.  Hammertoe repair, first digit of the left foot.  2.  Hammertoe repair, fifth digit of the left foot.     HEMOSTASIS:  None.     ESTIMATED BLOOD LOSS:  3 mL.     COMPLICATIONS:  None.     MATERIALS:  3-0 and 4-0 Vicryl and 5-0 nylon.     PATHOLOGY:  None.     DESCRIPTION OF PROCEDURE:  The patient was brought to the OR via a cart,   placed on the OR table in supine position, at which time general anesthesia   was administered.  Local infiltration Morales block was performed at the first   and fifth digit of the left foot with the aforementioned material.  Sterile   prep and draping was accomplished.  No hemostasis was utilized.  Attention   directed hammertoe 5th left foot and approximately 2 cm in length linear   longitudinal incision was made over the dorsal PIPJ. Area was retracted.  This   was made with a 15 blade. Transverse tenotomy was performed.  Tendon was   retracted proximally and distally exposing atrophied and enlarged head of the   fifth digit proximal phalangeal joint region.  Power saw was utilized to   resect this area.  The area was then decompressed.  The area was flushed with   copious amounts of normal saline solution with bacitracin solution.  Good   alignment was noted.  A normal anatomical position.  A 3-0 Vicryl was utilized   in horizontal  mattress technique to re-secure the dorsal extensor tendon.  A   4-0 Vicryl in horizontal mattress technique was utilized for one subcuticular   stitch.  Skin reapproximated with 5-0 nylon in a horizontal mattress   interrupted stitch.  Attention directed to the hallux hammertoe where similar   pathology was performed as there was an enlarged head of the proximal phalanx   with a larger medial condyle, atrophied and somewhat fused.  This was resected   with the power oscillating saw, freed and extirpated from the wound. Area was   decompressed.  Again, the long extensor tendon was retracted.  This was   resutured following the flushing with 3-0 Vicryl in horizontal mattress   technique with three separate sutures with very good security was noted.    Subcutaneous stitch, 3-0 Vicryl, one stitch, several 4-0 Vicryls were made for   the superficial subcuticular closure.  Skin reapproximated in horizontal   mattress technique with 5-0 nylon in interrupted fashion.  Capillary refill   time one second digits 1 through 5 of the left foot. Area was cleansed with   normal saline and water.  The skin area was flushed prior to closure also with   normal saline solution.  Xeroform was placed, 4x4 gauze, 3 inch Camilo,   followed by Coban.  The patient tolerated the procedure and anesthesia well   and left the OR for postanesthesia recovery with vital signs stable and   vascular status intact.  Postoperatively, she will be discharged home when   stable with a long air walker boot.  She is written and verbal instructions.    She has pain medication, Norco 5/325 adjunct usage of ibuprofen as deemed   necessary.  She will have Keflex 500 mg 4 times a day for prophylactic   antibiosis, was given 2 grams of Ancef preoperatively prior to the procedure.    Again, she will be followed next week.  Discharge today when stable and call   if problems, questions or concerns.        ______________________________  DL JURADO,  ALLEY SHANKAR/GAVINO/[facility]    DD:  05/20/2022 14:09  DT:  05/20/2022 14:49    Job#:  [id number]    CC:ZAN PELAEZ MD

## 2022-05-20 NOTE — ANESTHESIA POSTPROCEDURE EVALUATION
Patient: Rosetta Moreira    Procedure Summary     Date: 05/20/22 Room / Location:  OR 04 / SURGERY [facility]    Anesthesia Start: 1309 Anesthesia Stop: 1419    Procedure: CORRECTION, HAMMER TOE - FIRST AND FIFTH DIGITS, FOOT; (Left Foot) Diagnosis: (HAMMER TOE)    Surgeons: Hammad Sierra D.P.M. Responsible Provider: Lucio Fernandez M.D.    Anesthesia Type: general ASA Status: 2          Final Anesthesia Type: general  Last vitals  BP   Blood Pressure : (!) 142/65    Temp   36 °C (96.8 °F)    Pulse   97   Resp   16    SpO2   98 %      Anesthesia Post Evaluation    Patient location during evaluation: PACU  Patient participation: complete - patient participated  Level of consciousness: awake and alert    Airway patency: patent  Anesthetic complications: no  Cardiovascular status: hemodynamically stable  Respiratory status: acceptable  Hydration status: euvolemic    PONV: none          There were no known complications for this encounter.     Nurse Pain Score: 0 (NPRS)

## 2022-05-20 NOTE — OR NURSING
1358 Pt arrived to PACU from OR. Report received from Elena ROSSI and Dr. Fernandez.  Pt on room air. Denies any pain or nausea at this time. Dressing CDI.     1415 Dr. Sierra at bedside.     1425 pt still denies any pain or nausea at this time.

## 2022-05-20 NOTE — ANESTHESIA PREPROCEDURE EVALUATION
Case: [id number] Date/Time: 05/20/22 1245    Procedure: CORRECTION, HAMMER TOE - FIRST AND FIFTH DIGITS, FOOT (Left Foot)    Anesthesia type: General    Pre-op diagnosis: HAMMER TOE    Location:  OR 04 / SURGERY [facility]    Surgeons: Hammad Sierra D.P.M.          Relevant Problems   No relevant active problems       Physical Exam    Airway   Mallampati: II  TM distance: >3 FB  Neck ROM: full       Cardiovascular - normal exam  Rhythm: regular  Rate: normal  (-) murmur     Dental - normal exam           Pulmonary - normal exam  Breath sounds clear to auscultation     Abdominal    Neurological - normal exam                 Anesthesia Plan    ASA 2       Plan - general       Airway plan will be LMA          Induction: intravenous    Postoperative Plan: Postoperative administration of opioids is intended.    Pertinent diagnostic labs and testing reviewed    Informed Consent:    Anesthetic plan and risks discussed with patient.    Use of blood products discussed with: patient whom consented to blood products.

## 2023-01-20 ENCOUNTER — OFFICE VISIT (OUTPATIENT)
Dept: URGENT CARE | Facility: PHYSICIAN GROUP | Age: 73
End: 2023-01-20
Payer: MEDICARE

## 2023-01-20 VITALS
HEART RATE: 100 BPM | DIASTOLIC BLOOD PRESSURE: 68 MMHG | TEMPERATURE: 97 F | WEIGHT: 218 LBS | OXYGEN SATURATION: 98 % | SYSTOLIC BLOOD PRESSURE: 148 MMHG | HEIGHT: 72 IN | BODY MASS INDEX: 29.53 KG/M2

## 2023-01-20 DIAGNOSIS — S86.911A KNEE STRAIN, RIGHT, INITIAL ENCOUNTER: ICD-10-CM

## 2023-01-20 PROCEDURE — 99203 OFFICE O/P NEW LOW 30 MIN: CPT | Performed by: NURSE PRACTITIONER

## 2023-01-20 RX ORDER — PREDNISONE 20 MG/1
20 TABLET ORAL DAILY
Qty: 5 TABLET | Refills: 0 | Status: SHIPPED | OUTPATIENT
Start: 2023-01-20 | End: 2023-01-25

## 2023-01-20 ASSESSMENT — ENCOUNTER SYMPTOMS
PAIN: 1
TINGLING: 0
SENSORY CHANGE: 0
CHILLS: 0
FALLS: 0
BRUISES/BLEEDS EASILY: 0
WEAKNESS: 0
FEVER: 0
MYALGIAS: 1

## 2023-01-20 ASSESSMENT — FIBROSIS 4 INDEX: FIB4 SCORE: 1.74

## 2023-01-20 NOTE — PROGRESS NOTES
Subjective     Rosetta Moreira is a 72 y.o. female who presents with Pain (Right Knee pain )            Pain  Associated symptoms include myalgias. Pertinent negatives include no chills, fever, rash or weakness.  has been experiencing acute right knee pain x3 days.   was attempting to stand up from ground-level when her right leg slid out from under her and felt pain in her knee.  No previous right knee pain or injury.  Has had recent surgery in her left foot and does use a cane to ambulate.  Taking over-the-counter ibuprofen, leg elevation and ice.  Denies swelling, redness or bruising.   does have full range of motion with discomfort on ambulation.    PMH:  has a past medical history of CATARACT, Diabetes, Glaucoma, Idiopathic angioedema, Kidney stone, and Thyroid disease.    She has no past medical history of Hypertension.  MEDS:   Current Outpatient Medications:     predniSONE (DELTASONE) 20 MG Tab, Take 1 Tablet by mouth every day for 5 days., Disp: 5 Tablet, Rfl: 0    aspirin EC (ECOTRIN) 81 MG Tablet Delayed Response, Take 81 mg by mouth every day., Disp: , Rfl:     gabapentin (NEURONTIN) 100 MG Cap, Take 200 mg by mouth 3 times a day., Disp: , Rfl:     Pyridoxine HCl (VITAMIN B6) 100 MG Tab, Take 100 mg by mouth every day., Disp: , Rfl:     Cyanocobalamin (VITAMIN B-12) 1000 MCG Tab, Take 1,000 mcg by mouth every day., Disp: , Rfl:     CINNAMON PO, Take 1 tablet by mouth every day., Disp: , Rfl:     glipiZIDE (GLUCOTROL) 10 MG Tab, Take 10 mg by mouth 2 times a day., Disp: , Rfl:     insulin glargine (LANTUS) 100 UNIT/ML Solution, Inject 30 Units as instructed every evening., Disp: , Rfl:     levothyroxine (SYNTHROID) 100 MCG Tab, Take 100 mcg by mouth Every morning on an empty stomach., Disp: , Rfl:     atorvastatin (LIPITOR) 10 MG Tab, Take 10 mg by mouth every evening., Disp: , Rfl:     latanoprost (XALATAN) 0.005 % Solution, Place 1 Drop in both eyes every evening., Disp: , Rfl:  "  ALLERGIES:   Allergies   Allergen Reactions    Clindamycin Rash and Swelling     Whole body    Sulfa Drugs     Keflex Rash     \"Body rash\"    Lovaza [Omega-3-Acid Ethyl Esters] Diarrhea     acute    Naproxen Rash     \"Body rash\"    Pcn [Penicillins] Rash     \"Body rash\"    Percocet [Oxycodone-Acetaminophen] Itching     Full body     Robaxin [Kdc:Yellow Dye+Methocarbamol] Rash     rash    Travatan [Travoprost] Itching and Swelling    Hydrocodone Itching     SURGHX:   Past Surgical History:   Procedure Laterality Date    HAMMERTOE CORRECTION Left 5/20/2022    Procedure: CORRECTION, HAMMER TOE - FIRST AND FIFTH DIGITS, FOOT;;  Surgeon: Dl Sierra D.P.M.;  Location: SURGERY [facility];  Service: Podiatry    LIGAMENT REPAIR Left 2020    Foot    INCISION AND DRAINAGE GENERAL  1/18/2012    Performed by DL AMARAL at [facility] ORS    MAMMOPLASTY REDUCTION  9/27/2011    Performed by DL AMARAL at [facility] ORS    CATARACT PHACO WITH IOL  2/23/2011    Performed by CHAKA ANAYA at SURGERY SAME DAY [facility] ORS    CATARACT PHACO WITH IOL  2/9/2011    Performed by CHAKA ANAYA at SURGERY SAME DAY [facility] ORS    OTHER  2004    right chest area debridement    ABDOMINAL HYSTERECTOMY TOTAL      ACHILLES TENDON REPAIR      APPENDECTOMY      LUMPECTOMY      left breast     SOCHX:  reports that she quit smoking about 53 years ago. She has never used smokeless tobacco. She reports that she does not drink alcohol and does not use drugs.  FH: Family history was reviewed, no pertinent findings to report      Review of Systems   Constitutional:  Negative for chills, fever and malaise/fatigue.   Cardiovascular:  Negative for leg swelling.   Musculoskeletal:  Positive for joint pain and myalgias. Negative for falls.   Skin:  Negative for itching and rash.   Neurological:  Negative for tingling, sensory change and weakness.   Endo/Heme/Allergies:  Does not bruise/bleed easily.   All " other systems reviewed and are negative.           Objective     BP (!) 148/68   Pulse 100   Temp 36.1 °C (97 °F) (Temporal)   Ht 1.829 m (6') Comment: by Pt  Wt 98.9 kg (218 lb) Comment: by Pt  SpO2 98%   BMI 29.57 kg/m²      Physical Exam  Vitals reviewed.   Constitutional:       General: She is awake. She is not in acute distress.     Appearance: Normal appearance. She is well-developed. She is not ill-appearing, toxic-appearing or diaphoretic.   HENT:      Head: Normocephalic.   Cardiovascular:      Rate and Rhythm: Normal rate.   Pulmonary:      Effort: Pulmonary effort is normal.   Musculoskeletal:      Right knee: No swelling, deformity, effusion, erythema, ecchymosis, lacerations, bony tenderness or crepitus. Normal range of motion. Tenderness present over the medial joint line. No patellar tendon tenderness. No LCL laxity, MCL laxity, ACL laxity or PCL laxity. Normal alignment, normal meniscus and normal patellar mobility. Normal pulse.        Legs:    Skin:     General: Skin is warm and dry.      Findings: No abrasion, bruising, ecchymosis, erythema, signs of injury, laceration or wound.   Neurological:      Mental Status: She is alert and oriented to person, place, and time.   Psychiatric:         Attention and Perception: Attention normal.         Mood and Affect: Mood normal.         Speech: Speech normal.         Behavior: Behavior normal. Behavior is cooperative.                           Assessment & Plan        1. Knee strain, right, initial encounter    - predniSONE (DELTASONE) 20 MG Tab; Take 1 Tablet by mouth every day for 5 days.  Dispense: 5 Tablet; Refill: 0  - Referral to Sports Medicine      Patient to monitor blood sugars while on prednisone, patient aware of raising of blood sugars at this   -May use over the counter Tylenol as needed for pain/swelling  -May use cool compresses for swelling as needed  -May utilize RICE method as needed, use knee brace as needed for ambulation/weight  bearing  -Avoid excessive weight bearing to avoid further injury  -May apply topical analgesics as needed   -Perform proper body mechanics with lifting, twisting, bending and walking  -Monitor for deformity, numbness/tingling in toes, decreased range of motion with weight bearing- need re-evaluation  Follow-up with sports medicine as needed

## 2023-03-29 ENCOUNTER — HOSPITAL ENCOUNTER (OUTPATIENT)
Dept: RADIOLOGY | Facility: MEDICAL CENTER | Age: 73
End: 2023-03-29
Attending: NURSE PRACTITIONER
Payer: MEDICARE

## 2023-03-29 DIAGNOSIS — M25.361 KNEE INSTABILITY, RIGHT: ICD-10-CM

## 2023-03-29 DIAGNOSIS — M17.11 PRIMARY OSTEOARTHRITIS OF RIGHT KNEE: ICD-10-CM

## 2023-03-29 PROCEDURE — 73721 MRI JNT OF LWR EXTRE W/O DYE: CPT

## 2023-04-07 ENCOUNTER — TELEPHONE (OUTPATIENT)
Dept: HEALTH INFORMATION MANAGEMENT | Facility: OTHER | Age: 73
End: 2023-04-07
Payer: MEDICARE

## 2023-04-07 NOTE — TELEPHONE ENCOUNTER
OUTCOME: [facility]. PT HAS SURGERY ALREADY [facility] FOR THIS. NO LONGER NEEDING.     ATTEMPT # 1.

## 2023-04-17 ENCOUNTER — HOSPITAL ENCOUNTER (INPATIENT)
Facility: MEDICAL CENTER | Age: 73
LOS: 7 days | DRG: 175 | End: 2023-04-24
Attending: EMERGENCY MEDICINE | Admitting: INTERNAL MEDICINE
Payer: MEDICARE

## 2023-04-17 ENCOUNTER — APPOINTMENT (OUTPATIENT)
Dept: CARDIOLOGY | Facility: MEDICAL CENTER | Age: 73
DRG: 175 | End: 2023-04-17
Attending: EMERGENCY MEDICINE
Payer: MEDICARE

## 2023-04-17 ENCOUNTER — APPOINTMENT (OUTPATIENT)
Dept: RADIOLOGY | Facility: MEDICAL CENTER | Age: 73
DRG: 175 | End: 2023-04-17
Attending: EMERGENCY MEDICINE
Payer: MEDICARE

## 2023-04-17 DIAGNOSIS — K21.9 GASTROESOPHAGEAL REFLUX DISEASE, UNSPECIFIED WHETHER ESOPHAGITIS PRESENT: ICD-10-CM

## 2023-04-17 DIAGNOSIS — R55 SYNCOPE, UNSPECIFIED SYNCOPE TYPE: ICD-10-CM

## 2023-04-17 DIAGNOSIS — I10 HYPERTENSION, UNSPECIFIED TYPE: ICD-10-CM

## 2023-04-17 DIAGNOSIS — I26.99 PULMONARY EMBOLISM, OTHER, UNSPECIFIED CHRONICITY, UNSPECIFIED WHETHER ACUTE COR PULMONALE PRESENT (HCC): ICD-10-CM

## 2023-04-17 DIAGNOSIS — I26.09 ACUTE PULMONARY EMBOLISM WITH ACUTE COR PULMONALE, UNSPECIFIED PULMONARY EMBOLISM TYPE (HCC): ICD-10-CM

## 2023-04-17 PROBLEM — R74.8 ELEVATED LIVER ENZYMES: Status: ACTIVE | Noted: 2023-04-17

## 2023-04-17 PROBLEM — I82.431 ACUTE DEEP VEIN THROMBOSIS (DVT) OF POPLITEAL VEIN OF RIGHT LOWER EXTREMITY (HCC): Status: ACTIVE | Noted: 2023-04-17

## 2023-04-17 PROBLEM — E11.9 TYPE 2 DIABETES MELLITUS (HCC): Status: ACTIVE | Noted: 2023-04-17

## 2023-04-17 PROBLEM — Z71.89 ADVANCE CARE PLANNING: Status: ACTIVE | Noted: 2023-04-17

## 2023-04-17 LAB
ALBUMIN SERPL BCP-MCNC: 3.6 G/DL (ref 3.2–4.9)
ALBUMIN/GLOB SERPL: 1.6 G/DL
ALP SERPL-CCNC: 129 U/L (ref 30–99)
ALT SERPL-CCNC: 70 U/L (ref 2–50)
ANION GAP SERPL CALC-SCNC: 15 MMOL/L (ref 7–16)
APTT PPP: 23.4 SEC (ref 24.7–36)
AST SERPL-CCNC: 168 U/L (ref 12–45)
BASOPHILS # BLD AUTO: 0.5 % (ref 0–1.8)
BASOPHILS # BLD: 0.1 K/UL (ref 0–0.12)
BILIRUB SERPL-MCNC: 0.6 MG/DL (ref 0.1–1.5)
BUN SERPL-MCNC: 28 MG/DL (ref 8–22)
CALCIUM ALBUM COR SERPL-MCNC: 8.8 MG/DL (ref 8.5–10.5)
CALCIUM SERPL-MCNC: 8.5 MG/DL (ref 8.5–10.5)
CHLORIDE SERPL-SCNC: 108 MMOL/L (ref 96–112)
CO2 SERPL-SCNC: 17 MMOL/L (ref 20–33)
CREAT SERPL-MCNC: 0.93 MG/DL (ref 0.5–1.4)
D DIMER PPP IA.FEU-MCNC: 5.93 UG/ML (FEU) (ref 0–0.5)
EKG IMPRESSION: NORMAL
EOSINOPHIL # BLD AUTO: 0.14 K/UL (ref 0–0.51)
EOSINOPHIL NFR BLD: 0.8 % (ref 0–6.9)
ERYTHROCYTE [DISTWIDTH] IN BLOOD BY AUTOMATED COUNT: 49.6 FL (ref 35.9–50)
GFR SERPLBLD CREATININE-BSD FMLA CKD-EPI: 65 ML/MIN/1.73 M 2
GLOBULIN SER CALC-MCNC: 2.2 G/DL (ref 1.9–3.5)
GLUCOSE BLD STRIP.AUTO-MCNC: 394 MG/DL (ref 65–99)
GLUCOSE SERPL-MCNC: 367 MG/DL (ref 65–99)
HCT VFR BLD AUTO: 39.3 % (ref 37–47)
HGB BLD-MCNC: 12.9 G/DL (ref 12–16)
IMM GRANULOCYTES # BLD AUTO: 0.14 K/UL (ref 0–0.11)
IMM GRANULOCYTES NFR BLD AUTO: 0.8 % (ref 0–0.9)
INR PPP: 1.13 (ref 0.87–1.13)
LV EJECT FRACT  99904: 55
LV EJECT FRACT MOD 2C 99903: 41.22
LV EJECT FRACT MOD 4C 99902: 56.28
LYMPHOCYTES # BLD AUTO: 1.6 K/UL (ref 1–4.8)
LYMPHOCYTES NFR BLD: 8.7 % (ref 22–41)
MCH RBC QN AUTO: 31.1 PG (ref 27–33)
MCHC RBC AUTO-ENTMCNC: 32.8 G/DL (ref 33.6–35)
MCV RBC AUTO: 94.7 FL (ref 81.4–97.8)
MONOCYTES # BLD AUTO: 0.88 K/UL (ref 0–0.85)
MONOCYTES NFR BLD AUTO: 4.8 % (ref 0–13.4)
NEUTROPHILS # BLD AUTO: 15.47 K/UL (ref 2–7.15)
NEUTROPHILS NFR BLD: 84.4 % (ref 44–72)
NRBC # BLD AUTO: 0 K/UL
NRBC BLD-RTO: 0 /100 WBC
PLATELET # BLD AUTO: 200 K/UL (ref 164–446)
PMV BLD AUTO: 10.7 FL (ref 9–12.9)
POTASSIUM SERPL-SCNC: 4 MMOL/L (ref 3.6–5.5)
PROT SERPL-MCNC: 5.8 G/DL (ref 6–8.2)
PROTHROMBIN TIME: 14.3 SEC (ref 12–14.6)
RBC # BLD AUTO: 4.15 M/UL (ref 4.2–5.4)
SODIUM SERPL-SCNC: 140 MMOL/L (ref 135–145)
TROPONIN T SERPL-MCNC: 81 NG/L (ref 6–19)
UFH PPP CHRO-ACNC: <0.1 IU/ML
WBC # BLD AUTO: 18.3 K/UL (ref 4.8–10.8)

## 2023-04-17 PROCEDURE — 700111 HCHG RX REV CODE 636 W/ 250 OVERRIDE (IP)

## 2023-04-17 PROCEDURE — 99223 1ST HOSP IP/OBS HIGH 75: CPT | Mod: 25,AI | Performed by: INTERNAL MEDICINE

## 2023-04-17 PROCEDURE — 84484 ASSAY OF TROPONIN QUANT: CPT

## 2023-04-17 PROCEDURE — 96365 THER/PROPH/DIAG IV INF INIT: CPT

## 2023-04-17 PROCEDURE — 85379 FIBRIN DEGRADATION QUANT: CPT

## 2023-04-17 PROCEDURE — 71275 CT ANGIOGRAPHY CHEST: CPT

## 2023-04-17 PROCEDURE — 82962 GLUCOSE BLOOD TEST: CPT | Mod: 91

## 2023-04-17 PROCEDURE — 85520 HEPARIN ASSAY: CPT

## 2023-04-17 PROCEDURE — 700111 HCHG RX REV CODE 636 W/ 250 OVERRIDE (IP): Performed by: INTERNAL MEDICINE

## 2023-04-17 PROCEDURE — 93970 EXTREMITY STUDY: CPT

## 2023-04-17 PROCEDURE — 85610 PROTHROMBIN TIME: CPT

## 2023-04-17 PROCEDURE — 99497 ADVNCD CARE PLAN 30 MIN: CPT | Performed by: INTERNAL MEDICINE

## 2023-04-17 PROCEDURE — 770000 HCHG ROOM/CARE - INTERMEDIATE ICU *

## 2023-04-17 PROCEDURE — 96375 TX/PRO/DX INJ NEW DRUG ADDON: CPT

## 2023-04-17 PROCEDURE — 70450 CT HEAD/BRAIN W/O DYE: CPT

## 2023-04-17 PROCEDURE — 71045 X-RAY EXAM CHEST 1 VIEW: CPT

## 2023-04-17 PROCEDURE — 93306 TTE W/DOPPLER COMPLETE: CPT

## 2023-04-17 PROCEDURE — 93306 TTE W/DOPPLER COMPLETE: CPT | Mod: 26 | Performed by: INTERNAL MEDICINE

## 2023-04-17 PROCEDURE — 700111 HCHG RX REV CODE 636 W/ 250 OVERRIDE (IP): Performed by: EMERGENCY MEDICINE

## 2023-04-17 PROCEDURE — 99222 1ST HOSP IP/OBS MODERATE 55: CPT | Performed by: INTERNAL MEDICINE

## 2023-04-17 PROCEDURE — 700102 HCHG RX REV CODE 250 W/ 637 OVERRIDE(OP): Performed by: INTERNAL MEDICINE

## 2023-04-17 PROCEDURE — 700101 HCHG RX REV CODE 250: Performed by: STUDENT IN AN ORGANIZED HEALTH CARE EDUCATION/TRAINING PROGRAM

## 2023-04-17 PROCEDURE — 85025 COMPLETE CBC W/AUTO DIFF WBC: CPT

## 2023-04-17 PROCEDURE — 36415 COLL VENOUS BLD VENIPUNCTURE: CPT

## 2023-04-17 PROCEDURE — A9270 NON-COVERED ITEM OR SERVICE: HCPCS | Performed by: INTERNAL MEDICINE

## 2023-04-17 PROCEDURE — 93005 ELECTROCARDIOGRAM TRACING: CPT | Performed by: EMERGENCY MEDICINE

## 2023-04-17 PROCEDURE — 99285 EMERGENCY DEPT VISIT HI MDM: CPT

## 2023-04-17 PROCEDURE — 700117 HCHG RX CONTRAST REV CODE 255: Performed by: EMERGENCY MEDICINE

## 2023-04-17 PROCEDURE — 96366 THER/PROPH/DIAG IV INF ADDON: CPT

## 2023-04-17 PROCEDURE — 96372 THER/PROPH/DIAG INJ SC/IM: CPT

## 2023-04-17 PROCEDURE — 80053 COMPREHEN METABOLIC PANEL: CPT

## 2023-04-17 PROCEDURE — 85730 THROMBOPLASTIN TIME PARTIAL: CPT

## 2023-04-17 RX ORDER — GABAPENTIN 100 MG/1
200 CAPSULE ORAL 3 TIMES DAILY
Status: DISCONTINUED | OUTPATIENT
Start: 2023-04-17 | End: 2023-04-24 | Stop reason: HOSPADM

## 2023-04-17 RX ORDER — ENOXAPARIN SODIUM 100 MG/ML
100 INJECTION SUBCUTANEOUS EVERY 12 HOURS
Status: DISCONTINUED | OUTPATIENT
Start: 2023-04-17 | End: 2023-04-18

## 2023-04-17 RX ORDER — BISACODYL 10 MG
10 SUPPOSITORY, RECTAL RECTAL
Status: DISCONTINUED | OUTPATIENT
Start: 2023-04-17 | End: 2023-04-21

## 2023-04-17 RX ORDER — ONDANSETRON 4 MG/1
4 TABLET, ORALLY DISINTEGRATING ORAL ONCE
Status: COMPLETED | OUTPATIENT
Start: 2023-04-17 | End: 2023-04-17

## 2023-04-17 RX ORDER — HEPARIN SODIUM 1000 [USP'U]/ML
2000 INJECTION, SOLUTION INTRAVENOUS; SUBCUTANEOUS PRN
Status: DISCONTINUED | OUTPATIENT
Start: 2023-04-17 | End: 2023-04-17

## 2023-04-17 RX ORDER — POLYETHYLENE GLYCOL 3350 17 G/17G
1 POWDER, FOR SOLUTION ORAL
Status: DISCONTINUED | OUTPATIENT
Start: 2023-04-17 | End: 2023-04-21

## 2023-04-17 RX ORDER — LATANOPROST 50 UG/ML
1 SOLUTION/ DROPS OPHTHALMIC NIGHTLY
Status: DISCONTINUED | OUTPATIENT
Start: 2023-04-17 | End: 2023-04-24 | Stop reason: HOSPADM

## 2023-04-17 RX ORDER — HEPARIN SODIUM 1000 [USP'U]/ML
4000 INJECTION, SOLUTION INTRAVENOUS; SUBCUTANEOUS ONCE
Status: DISCONTINUED | OUTPATIENT
Start: 2023-04-17 | End: 2023-04-17

## 2023-04-17 RX ORDER — HEPARIN SODIUM 5000 [USP'U]/100ML
0-30 INJECTION, SOLUTION INTRAVENOUS CONTINUOUS
Status: DISPENSED | OUTPATIENT
Start: 2023-04-17 | End: 2023-04-17

## 2023-04-17 RX ORDER — ONDANSETRON 4 MG/1
4 TABLET, ORALLY DISINTEGRATING ORAL EVERY 4 HOURS PRN
Status: DISCONTINUED | OUTPATIENT
Start: 2023-04-17 | End: 2023-04-24 | Stop reason: HOSPADM

## 2023-04-17 RX ORDER — ASPIRIN 325 MG
975 TABLET ORAL EVERY 8 HOURS PRN
Status: ON HOLD | COMMUNITY
End: 2023-04-24

## 2023-04-17 RX ORDER — LEVOTHYROXINE SODIUM 0.1 MG/1
100 TABLET ORAL
Status: DISCONTINUED | OUTPATIENT
Start: 2023-04-17 | End: 2023-04-24 | Stop reason: HOSPADM

## 2023-04-17 RX ORDER — HEPARIN SODIUM 1000 [USP'U]/ML
4000 INJECTION, SOLUTION INTRAVENOUS; SUBCUTANEOUS ONCE
Status: COMPLETED | OUTPATIENT
Start: 2023-04-17 | End: 2023-04-17

## 2023-04-17 RX ORDER — ONDANSETRON 2 MG/ML
4 INJECTION INTRAMUSCULAR; INTRAVENOUS EVERY 4 HOURS PRN
Status: DISCONTINUED | OUTPATIENT
Start: 2023-04-17 | End: 2023-04-24 | Stop reason: HOSPADM

## 2023-04-17 RX ORDER — AMOXICILLIN 250 MG
2 CAPSULE ORAL 2 TIMES DAILY
Status: DISCONTINUED | OUTPATIENT
Start: 2023-04-17 | End: 2023-04-21

## 2023-04-17 RX ORDER — HEPARIN SODIUM 5000 [USP'U]/100ML
0-30 INJECTION, SOLUTION INTRAVENOUS CONTINUOUS
Status: DISCONTINUED | OUTPATIENT
Start: 2023-04-17 | End: 2023-04-17

## 2023-04-17 RX ORDER — LIDOCAINE 50 MG/G
1 PATCH TOPICAL EVERY 24 HOURS
Status: DISCONTINUED | OUTPATIENT
Start: 2023-04-17 | End: 2023-04-24 | Stop reason: HOSPADM

## 2023-04-17 RX ORDER — IBUPROFEN 200 MG
800 TABLET ORAL EVERY 6 HOURS PRN
Status: ON HOLD | COMMUNITY
End: 2023-04-24

## 2023-04-17 RX ORDER — HEPARIN SODIUM 1000 [USP'U]/ML
2000 INJECTION, SOLUTION INTRAVENOUS; SUBCUTANEOUS PRN
Status: ACTIVE | OUTPATIENT
Start: 2023-04-17 | End: 2023-04-17

## 2023-04-17 RX ORDER — ATORVASTATIN CALCIUM 10 MG/1
10 TABLET, FILM COATED ORAL NIGHTLY
Status: DISCONTINUED | OUTPATIENT
Start: 2023-04-17 | End: 2023-04-24 | Stop reason: HOSPADM

## 2023-04-17 RX ORDER — ACETAMINOPHEN 325 MG/1
650 TABLET ORAL EVERY 6 HOURS PRN
Status: DISCONTINUED | OUTPATIENT
Start: 2023-04-17 | End: 2023-04-24 | Stop reason: HOSPADM

## 2023-04-17 RX ADMIN — HEPARIN SODIUM 4000 UNITS: 1000 INJECTION, SOLUTION INTRAVENOUS; SUBCUTANEOUS at 10:50

## 2023-04-17 RX ADMIN — ENOXAPARIN SODIUM 100 MG: 100 INJECTION SUBCUTANEOUS at 13:16

## 2023-04-17 RX ADMIN — INSULIN HUMAN 4 UNITS: 100 INJECTION, SOLUTION PARENTERAL at 20:18

## 2023-04-17 RX ADMIN — LATANOPROST 1 DROP: 50 SOLUTION OPHTHALMIC at 21:40

## 2023-04-17 RX ADMIN — INSULIN GLARGINE-YFGN 25 UNITS: 100 INJECTION, SOLUTION SUBCUTANEOUS at 20:18

## 2023-04-17 RX ADMIN — LIDOCAINE 1 PATCH: 50 PATCH TOPICAL at 20:18

## 2023-04-17 RX ADMIN — IOHEXOL 100 ML: 350 INJECTION, SOLUTION INTRAVENOUS at 10:08

## 2023-04-17 RX ADMIN — ONDANSETRON 4 MG: 4 TABLET, ORALLY DISINTEGRATING ORAL at 10:36

## 2023-04-17 RX ADMIN — LEVOTHYROXINE SODIUM 100 MCG: 100 TABLET ORAL at 13:16

## 2023-04-17 RX ADMIN — ONDANSETRON 4 MG: 4 TABLET, ORALLY DISINTEGRATING ORAL at 08:47

## 2023-04-17 RX ADMIN — GABAPENTIN 200 MG: 100 CAPSULE ORAL at 17:09

## 2023-04-17 RX ADMIN — GABAPENTIN 200 MG: 100 CAPSULE ORAL at 13:16

## 2023-04-17 RX ADMIN — HEPARIN SODIUM 12 UNITS/KG/HR: 5000 INJECTION, SOLUTION INTRAVENOUS at 10:52

## 2023-04-17 RX ADMIN — INSULIN HUMAN 5 UNITS: 100 INJECTION, SOLUTION PARENTERAL at 17:10

## 2023-04-17 RX ADMIN — ATORVASTATIN CALCIUM 10 MG: 10 TABLET, FILM COATED ORAL at 20:18

## 2023-04-17 ASSESSMENT — PATIENT HEALTH QUESTIONNAIRE - PHQ9
SUM OF ALL RESPONSES TO PHQ9 QUESTIONS 1 AND 2: 0
1. LITTLE INTEREST OR PLEASURE IN DOING THINGS: NOT AT ALL
2. FEELING DOWN, DEPRESSED, IRRITABLE, OR HOPELESS: NOT AT ALL
2. FEELING DOWN, DEPRESSED, IRRITABLE, OR HOPELESS: NOT AT ALL
SUM OF ALL RESPONSES TO PHQ9 QUESTIONS 1 AND 2: 0
1. LITTLE INTEREST OR PLEASURE IN DOING THINGS: NOT AT ALL

## 2023-04-17 ASSESSMENT — LIFESTYLE VARIABLES
ALCOHOL_USE: NO
AVERAGE NUMBER OF DAYS PER WEEK YOU HAVE A DRINK CONTAINING ALCOHOL: 0
ALCOHOL_USE: NO
TOTAL SCORE: 0
EVER HAD A DRINK FIRST THING IN THE MORNING TO STEADY YOUR NERVES TO GET RID OF A HANGOVER: NO
SUBSTANCE_ABUSE: 0
TOTAL SCORE: 0
EVER FELT BAD OR GUILTY ABOUT YOUR DRINKING: NO
HAVE PEOPLE ANNOYED YOU BY CRITICIZING YOUR DRINKING: NO
HAVE PEOPLE ANNOYED YOU BY CRITICIZING YOUR DRINKING: NO
HAVE YOU EVER FELT YOU SHOULD CUT DOWN ON YOUR DRINKING: NO
EVER FELT BAD OR GUILTY ABOUT YOUR DRINKING: NO
TOTAL SCORE: 0
ON A TYPICAL DAY WHEN YOU DRINK ALCOHOL HOW MANY DRINKS DO YOU HAVE: 0
HAVE YOU EVER FELT YOU SHOULD CUT DOWN ON YOUR DRINKING: NO
EVER HAD A DRINK FIRST THING IN THE MORNING TO STEADY YOUR NERVES TO GET RID OF A HANGOVER: NO
TOTAL SCORE: 0
CONSUMPTION TOTAL: INCOMPLETE
HOW MANY TIMES IN THE PAST YEAR HAVE YOU HAD 5 OR MORE DRINKS IN A DAY: 0
CONSUMPTION TOTAL: NEGATIVE
TOTAL SCORE: 0
TOTAL SCORE: 0

## 2023-04-17 ASSESSMENT — ENCOUNTER SYMPTOMS
CHILLS: 0
SPUTUM PRODUCTION: 0
BACK PAIN: 0
LOSS OF CONSCIOUSNESS: 1
NAUSEA: 0
FEVER: 0
COUGH: 0
CONSTIPATION: 0
PHOTOPHOBIA: 0
SHORTNESS OF BREATH: 1
DIZZINESS: 1
FOCAL WEAKNESS: 0
SPEECH CHANGE: 0
MYALGIAS: 0
DOUBLE VISION: 0
NECK PAIN: 0
SENSORY CHANGE: 0
HALLUCINATIONS: 0
ABDOMINAL PAIN: 0
PALPITATIONS: 0
VOMITING: 0
ORTHOPNEA: 0
HEADACHES: 0
TINGLING: 0
TREMORS: 0
EYE PAIN: 0
WEIGHT LOSS: 0
DIARRHEA: 0
BLURRED VISION: 0
DIZZINESS: 0

## 2023-04-17 ASSESSMENT — COPD QUESTIONNAIRES
HAVE YOU SMOKED AT LEAST 100 CIGARETTES IN YOUR ENTIRE LIFE: YES
DO YOU EVER COUGH UP ANY MUCUS OR PHLEGM?: NO/ONLY WITH OCCASIONAL COLDS OR INFECTIONS
DURING THE PAST 4 WEEKS HOW MUCH DID YOU FEEL SHORT OF BREATH: SOME OF THE TIME
COPD SCREENING SCORE: 6

## 2023-04-17 ASSESSMENT — FIBROSIS 4 INDEX
FIB4 SCORE: 7.33

## 2023-04-17 ASSESSMENT — PAIN DESCRIPTION - PAIN TYPE
TYPE: ACUTE PAIN;CHRONIC PAIN
TYPE: ACUTE PAIN;CHRONIC PAIN

## 2023-04-17 NOTE — ASSESSMENT & PLAN NOTE
-Unprovoked  -Intermediate risk PE with a MALA score of II, syncope a concerning finding  -Given her clinical stability now, her age her recent falls including head trauma (albeit mild) I think the risks of alteplase, both systemic and catheter directed, outweigh the benefits at this point in time.  -Recommend lovenox therapy with close monitoring in IMCU  -If patient decompensates, consider escalation to EKOS or systemic alteplase. Critical care would be happy to help with this decision  -Transition to DOAC if she does well in the coming days.  Recommend minimum 3 months therapy and then a risks/benefits conversation about ongoing anticoagulation  -I would recommend she postpone her knee surgery until 3 months after this event when she can more safely come off her anticoagulation

## 2023-04-17 NOTE — PROGRESS NOTES
4 Eyes Skin Assessment Completed by David RN and Elaina RN.    Head WDL  Ears WDL  Nose WDL  Mouth WDL  Neck WDL  Breast/Chest WDL  Shoulder Blades WDL  Spine WDL  (R) Arm/Elbow/Hand WDL  (L) Arm/Elbow/Hand WDL  Abdomen WDL  Groin WDL  Scrotum/Coccyx/Buttocks Redness and Blanching  (R) Leg WDL  (L) Leg WDL  (R) Heel/Foot/Toe Redness, Blanching, and Boggy, Callouses  (L) Heel/Foot/Toe Redness, Blanching, and Boggy, Callouses on medial pad        Devices In Places ECG, Blood Pressure Cuff, Pulse Ox, and Nasal Cannula      Interventions In Place Gray Ear Foams, Pillows, Q2 Turns, Low Air Loss Mattress, and Heels Loaded W/Pillows    Possible Skin Injury No    Pictures Uploaded Into Epic N/A  Wound Consult Placed N/A  RN Wound Prevention Protocol Ordered Yes

## 2023-04-17 NOTE — ED NOTES
PIV initiated with US guidance by assist RN. Blood drawn and tubed to lab.    ER tech aware of need for EKG.    ERP at bedside

## 2023-04-17 NOTE — PROGRESS NOTES
"Pt brought up to [facility] with ACLS RN. VSS. No pain at this time. Attempted to ambulate to BR with FWW. Once in the BR pt started to feel dizzy and feelings of impending doom stating \"I'm going to faint\" Pt sat on toilet and became pale, diaphoretic and minimally responsive to questions momentarily. -LOC. MD called to bedside, pt wheeled back to bed and put on strict bedrest. Pt c/o air hunger. Pt placed on 6L Nasal cannula, SpO2 95%. /57 . After a few min, pt began to regain color and a feeling of normalcy.   "

## 2023-04-17 NOTE — CONSULTS
Critical Care Consultation    Date of consult: 4/17/2023    Referring Physician  Rayna Cuevas M.D.;Mu*    Reason for Consultation  Acute PE    History of Presenting Illness  73 y.o. female who presented 4/17/2023 with ho DM, HTN, HLD, who presented with syncope. She was on the toilet this morning and became acutely lightheaded and dyspneic.  Tried to stand but couldn't so helped herself to the ground. Her stepdaughter heard her fall and found her unresponsive. Stepdaughter estimates ~5min LOC. BIBA and in ER found to have extensive PE. I was consulted for consideration of advanced therapies.  Patient needs a knee replacement and has had several mechanical falls recently due to knee issues, then today's fall.  She has hit her head, but not hard and on soft surfaces. No history of bleeding issues, never had an ICHNo personal or known FH VTE. She has had her age appropriate cancer screening, although missed last years mammogram. No recent smoking. No hormone therapy.    Code Status  Prior    Review of Systems  Review of Systems   Respiratory:  Positive for shortness of breath.    Cardiovascular:  Positive for chest pain. Negative for leg swelling.   Neurological:  Positive for dizziness and loss of consciousness.     Past Medical History   has a past medical history of CATARACT, Diabetes, Glaucoma, Idiopathic angioedema, Kidney stone, and Thyroid disease.    She has no past medical history of Hypertension.    Surgical History   has a past surgical history that includes appendectomy; lumpectomy; abdominal hysterectomy total; achilles tendon repair; cataract phaco with iol (2/9/2011); cataract phaco with iol (2/23/2011); other (2004); mammoplasty reduction (9/27/2011); incision and drainage general (1/18/2012); ligament repair (Left, 2020); and hammertoe correction (Left, 5/20/2022).    Family History  family history includes Cancer in an other family member; Diabetes in an other family member; Heart Disease in an  other family member; Hypertension in an other family member.    Social History   reports that she quit smoking about 53 years ago. Her smoking use included cigarettes. She has never used smokeless tobacco. She reports that she does not drink alcohol and does not use drugs.    Medications  Home Medications       Reviewed by Esperanza Almaraz (Pharmacy Tech) on 04/17/23 at 1130  Med List Status: Complete     Medication Last Dose Status   acetaminophen-codeine #3 (TYLENOL #3) 300-30 MG Tab 4/15/2023 Active   aspirin (ASA) 325 MG Tab 4/16/2023 Active   aspirin EC (ECOTRIN) 81 MG Tablet Delayed Response > 2 weeks Active   atorvastatin (LIPITOR) 10 MG Tab 4/16/2023 Active   CINNAMON PO 4/16/2023 Active   Cyanocobalamin (VITAMIN B-12) 1000 MCG Tab 4/16/2023 Active   docusate sodium (COLACE) 100 MG Cap 4/16/2023 Active   gabapentin (NEURONTIN) 100 MG Cap 4/16/2023 Active   glipiZIDE (GLUCOTROL) 10 MG Tab 4/16/2023 Active   ibuprofen (MOTRIN) 200 MG Tab 4/17/2023 Active   insulin glargine (LANTUS) 100 UNIT/ML Solution 4/16/2023 Active   latanoprost (XALATAN) 0.005 % Solution 4/16/2023 Active   levothyroxine (SYNTHROID) 100 MCG Tab 4/16/2023 Active   Pyridoxine HCl (VITAMIN B6) 100 MG Tab 4/16/2023 Active                  Current Facility-Administered Medications   Medication Dose Route Frequency Provider Last Rate Last Admin    heparin infusion 25,000 units in 500 mL 0.45% NACL  0-30 Units/kg/hr (Adjusted) Intravenous Continuous Darcie Jones M.D. 19.7 mL/hr at 04/17/23 1052 12 Units/kg/hr at 04/17/23 1052    heparin injection 2,000 Units  2,000 Units Intravenous PRN Darcie Jones M.D.        enoxaparin (Lovenox) inj 100 mg  100 mg Subcutaneous Q12HRS Darcie Jones M.D.         Current Outpatient Medications   Medication Sig Dispense Refill    aspirin (ASA) 325 MG Tab Take 975 mg by mouth every 8 hours as needed.      ibuprofen (MOTRIN) 200 MG Tab Take 800 mg by mouth every 6 hours as needed.       "acetaminophen-codeine #3 (TYLENOL #3) 300-30 MG Tab Take 0.25-0.5 Tablets by mouth every evening as needed (SLEEP).      docusate sodium (COLACE) 100 MG Cap Take 100 mg by mouth every evening.      aspirin EC (ECOTRIN) 81 MG Tablet Delayed Response Take 81 mg by mouth every day.      gabapentin (NEURONTIN) 100 MG Cap Take 200 mg by mouth 3 times a day.      Pyridoxine HCl (VITAMIN B6) 100 MG Tab Take 100 mg by mouth every day.      Cyanocobalamin (VITAMIN B-12) 1000 MCG Tab Take 1,000 mcg by mouth every day.      CINNAMON PO Take 1 Tablet by mouth 2 times a day.      glipiZIDE (GLUCOTROL) 10 MG Tab Take 10 mg by mouth 2 times a day.      insulin glargine (LANTUS) 100 UNIT/ML Solution Inject 30 Units as instructed every evening.      levothyroxine (SYNTHROID) 100 MCG Tab Take 100 mcg by mouth Every morning on an empty stomach.      atorvastatin (LIPITOR) 10 MG Tab Take 10 mg by mouth every evening.      latanoprost (XALATAN) 0.005 % Solution Place 1 Drop in both eyes every evening.         Allergies  Allergies   Allergen Reactions    Clindamycin Rash and Swelling     Whole body    Sulfa Drugs     Keflex Rash     \"Body rash\"    Lovaza [Omega-3-Acid Ethyl Esters] Diarrhea     acute    Naproxen Rash     \"Body rash\"    Pcn [Penicillins] Rash     \"Body rash\"    Percocet [Oxycodone-Acetaminophen] Itching     Full body     Robaxin [Kdc:Yellow Dye+Methocarbamol] Rash     rash    Travatan [Travoprost] Itching and Swelling    Robaxin [Methocarbamol]     Hydrocodone Itching       Vital Signs last 24 hours  Temp:  [36.8 °C (98.3 °F)] 36.8 °C (98.3 °F)  Pulse:  [88-98] 98  Resp:  [18-20] 19  BP: (106-125)/(55-61) 125/59  SpO2:  [87 %-97 %] 97 %    Physical Exam  Physical Exam  Constitutional:       General: She is not in acute distress.     Appearance: She is obese. She is not ill-appearing.   HENT:      Mouth/Throat:      Mouth: Mucous membranes are dry.   Eyes:      Pupils: Pupils are equal, round, and reactive to light. "   Cardiovascular:      Rate and Rhythm: Normal rate and regular rhythm.   Pulmonary:      Effort: Pulmonary effort is normal. No respiratory distress.      Breath sounds: Normal breath sounds.   Abdominal:      General: Bowel sounds are normal. There is no distension.      Tenderness: There is no abdominal tenderness.   Musculoskeletal:      Right lower leg: Edema present.      Left lower leg: Edema present.   Skin:     General: Skin is warm and dry.   Neurological:      General: No focal deficit present.      Mental Status: She is alert and oriented to person, place, and time.   Psychiatric:         Mood and Affect: Mood normal.       Fluids  No intake or output data in the 24 hours ending 04/17/23 1208    Laboratory  Recent Results (from the past 48 hour(s))   CBC WITH DIFFERENTIAL    Collection Time: 04/17/23  8:58 AM   Result Value Ref Range    WBC 18.3 (H) 4.8 - 10.8 K/uL    RBC 4.15 (L) 4.20 - 5.40 M/uL    Hemoglobin 12.9 12.0 - 16.0 g/dL    Hematocrit 39.3 37.0 - 47.0 %    MCV 94.7 81.4 - 97.8 fL    MCH 31.1 27.0 - 33.0 pg    MCHC 32.8 (L) 33.6 - 35.0 g/dL    RDW 49.6 35.9 - 50.0 fL    Platelet Count 200 164 - 446 K/uL    MPV 10.7 9.0 - 12.9 fL    Neutrophils-Polys 84.40 (H) 44.00 - 72.00 %    Lymphocytes 8.70 (L) 22.00 - 41.00 %    Monocytes 4.80 0.00 - 13.40 %    Eosinophils 0.80 0.00 - 6.90 %    Basophils 0.50 0.00 - 1.80 %    Immature Granulocytes 0.80 0.00 - 0.90 %    Nucleated RBC 0.00 /100 WBC    Neutrophils (Absolute) 15.47 (H) 2.00 - 7.15 K/uL    Lymphs (Absolute) 1.60 1.00 - 4.80 K/uL    Monos (Absolute) 0.88 (H) 0.00 - 0.85 K/uL    Eos (Absolute) 0.14 0.00 - 0.51 K/uL    Baso (Absolute) 0.10 0.00 - 0.12 K/uL    Immature Granulocytes (abs) 0.14 (H) 0.00 - 0.11 K/uL    NRBC (Absolute) 0.00 K/uL   COMP METABOLIC PANEL    Collection Time: 04/17/23  8:58 AM   Result Value Ref Range    Sodium 140 135 - 145 mmol/L    Potassium 4.0 3.6 - 5.5 mmol/L    Chloride 108 96 - 112 mmol/L    Co2 17 (L) 20 - 33  mmol/L    Anion Gap 15.0 7.0 - 16.0    Glucose 367 (H) 65 - 99 mg/dL    Bun 28 (H) 8 - 22 mg/dL    Creatinine 0.93 0.50 - 1.40 mg/dL    Calcium 8.5 8.5 - 10.5 mg/dL    AST(SGOT) 168 (H) 12 - 45 U/L    ALT(SGPT) 70 (H) 2 - 50 U/L    Alkaline Phosphatase 129 (H) 30 - 99 U/L    Total Bilirubin 0.6 0.1 - 1.5 mg/dL    Albumin 3.6 3.2 - 4.9 g/dL    Total Protein 5.8 (L) 6.0 - 8.2 g/dL    Globulin 2.2 1.9 - 3.5 g/dL    A-G Ratio 1.6 g/dL   TROPONIN    Collection Time: 23  8:58 AM   Result Value Ref Range    Troponin T 81 (H) 6 - 19 ng/L   D-DIMER    Collection Time: 23  8:58 AM   Result Value Ref Range    D-Dimer 5.93 (H) 0.00 - 0.50 ug/mL (FEU)   CORRECTED CALCIUM    Collection Time: 23  8:58 AM   Result Value Ref Range    Correct Calcium 8.8 8.5 - 10.5 mg/dL   ESTIMATED GFR    Collection Time: 23  8:58 AM   Result Value Ref Range    GFR (CKD-EPI) 65 >60 mL/min/1.73 m 2   EKG    Collection Time: 23  9:38 AM   Result Value Ref Range    Report       [facility] Emergency Dept.    Test Date:  2023  Pt Name:    YENIFER VALDEZ               Department: ER  MRN:        [id number]                      Room:       [facility]  Gender:     Female                       Technician: [id number]  :        [id number]                   Requested By:RYAN HICKEY  Order #:    [id number]                    Reading MD:    Measurements  Intervals                                Axis  Rate:       96                           P:          48  MO:         199                          QRS:        -73  QRSD:       129                          T:          40  QT:         388  QTc:        491    Interpretive Statements  Sinus rhythm  RBBB and LAFB  Probable left ventricular hypertrophy  Compared to ECG 2011 14:02:38  Poor R-wave progression no longer present     aPTT    Collection Time: 23 10:42 AM   Result Value Ref Range    APTT 23.4 (L) 24.7 - 36.0 sec   Prothrombin Time    Collection Time:  04/17/23 10:42 AM   Result Value Ref Range    PT 14.3 12.0 - 14.6 sec    INR 1.13 0.87 - 1.13   Heparin Xa (Unfractionated)    Collection Time: 04/17/23 10:42 AM   Result Value Ref Range    Heparin Xa (UFH) <0.10 IU/mL       Imaging  US-EXTREMITY VENOUS LOWER BILAT   Final Result      EC-ECHOCARDIOGRAM COMPLETE W/O CONT   Final Result      CT-CTA CHEST PULMONARY ARTERY W/ RECONS   Final Result      1.  Extensive bilateral saddle pulmonary emboli.      2.  Severe right heart strain.            CT-HEAD W/O   Final Result      1.  Hyperostosis frontalis interna and dural calcifications.   2.  Otherwise unremarkable head CT for the patient's age.         DX-CHEST-PORTABLE (1 VIEW)   Final Result      No acute cardiac or pulmonary abnormalities are identified.          Assessment/Plan  * Acute pulmonary embolism with acute cor pulmonale, unspecified pulmonary embolism type (HCC)- (present on admission)  Assessment & Plan  -Unprovoked  -Intermediate risk PE with a MALA score of II, syncope a concerning finding  -Given her clinical stability now, her age her recent falls including head trauma (albeit mild) I think the risks of alteplase, both systemic and catheter directed, outweigh the benefits at this point in time.  -Recommend lovenox therapy with close monitoring in IMCU  -If patient decompensates, consider escalation to EKOS or systemic alteplase. Critical care would be happy to help with this decision  -Transition to DOAC if she does well in the coming days.  Recommend minimum 3 months therapy and then a risks/benefits conversation about ongoing anticoagulation  -I would recommend she postpone her knee surgery until 3 months after this event when she can more safely come off her anticoagulation         Discussed patient condition and risk of morbidity and/or mortality with Hospitalist, Family, and Patient.

## 2023-04-17 NOTE — ED NOTES
PIV appears to have infiltrated. Unable to draw blood back and painful. PIV removed. Assist RN contacted for new PIV with US guidance.

## 2023-04-17 NOTE — ED NOTES
Heparin drip stopped. Lovenox given. Pt medicated with home meds.     Bedside report given to IMC RN    Pt transported to floor.

## 2023-04-17 NOTE — ED NOTES
Blue top drawn and sent to lab. Heparin bolus given and heparin drip started at initial rate with dual RN sign off.

## 2023-04-17 NOTE — PROGRESS NOTES
Patient up from ER. Report received from Bhavin chowdhury,RN. POC and unit routine discussed. Bed alarm on and call light within reach. All orders reviewed.

## 2023-04-17 NOTE — ED TRIAGE NOTES
Pt BIBA via [facility]    Chief Complaint   Patient presents with    Syncope     Family reports the found pt to have syncopized on toilet and was unresponsive for about 5 minutes prior to EMS arrival. Pt had additional syncopal episode with EMS when transferring pt.    Nausea     Pt RA sat 88%. ABCs intact with pt on 2LPM via NC. A+Ox4 but lethargic. Pt denies CP/SOB. Skin PWD. Vitals/tele on the monitor.

## 2023-04-17 NOTE — ED PROVIDER NOTES
ED Provider Note    Scribed for Rayna Cuevas M.D. by Mukesh Swain 4/17/2023, 8:55 AM.    Primary care provider: Law Cuevas M.D.  Means of arrival: EMS  History obtained from: Patient  History limited by: None    CHIEF COMPLAINT  Chief Complaint   Patient presents with    Syncope     Family reports the found pt to have syncopized on toilet and was unresponsive for about 5 minutes prior to EMS arrival. Pt had additional syncopal episode with EMS when transferring pt.    Nausea       HPI/ROS  Rosetta Moreira is a 73 y.o. female with past medical history significant for diabetes who presents to the Emergency Department via EMS for acute syncope. Patient states she got up from bed to use the restroom. She made it to the restroom to urinate, but afterwards felt dizzy. She stayed on the toilet for a couple minutes to see if her dizziness resolved. She attempted to get up from the toilet but passed out and was found by her family who called EMS.  Per EMS family reports that she was unresponsive for approximately 5 minutes.  Her FSBS was 270 and she was found to be 88% on RA and placed on 2L. Patient denies have episodes of syncope previously. Otherwise she has been feeling normally.  She has had chronic right leg pain and is being evaluated by orthopedics for possible knee surgery.  Otherwise she has a history of type 2 diabetes for which she takes medications.  She denies headache, chest pain, shortness of breath, vomiting or abdominal pain.  She is having some mild nausea.    EXTERNAL RECORDS REVIEWED  Patient has a history of DM2 and is followed by primary care    LIMITATION TO HISTORY   Select: : None    OUTSIDE HISTORIAN(S):  EMS as above.       PAST MEDICAL HISTORY   has a past medical history of CATARACT, Diabetes, Glaucoma, Idiopathic angioedema, Kidney stone, and Thyroid disease.    SURGICAL HISTORY   has a past surgical history that includes appendectomy; lumpectomy; abdominal hysterectomy total;  "achilles tendon repair; cataract phaco with iol (2011); cataract phaco with iol (2011); other (); mammoplasty reduction (2011); incision and drainage general (2012); ligament repair (Left, ); and hammertoe correction (Left, 2022).    SOCIAL HISTORY  Social History     Tobacco Use    Smoking status: Former     Types: Cigarettes     Quit date: 1970     Years since quittin.3    Smokeless tobacco: Never    Tobacco comments:     40 years ago   Vaping Use    Vaping Use: Never used   Substance Use Topics    Alcohol use: No     Alcohol/week: 0.0 oz     Comment: rare    Drug use: No      Social History     Substance and Sexual Activity   Drug Use No       FAMILY HISTORY  Family History   Problem Relation Age of Onset    Diabetes Other     Heart Disease Other     Cancer Other     Hypertension Other     Stroke Neg Hx        CURRENT MEDICATIONS  Home Medications       Reviewed by Becky Castro R.N. (Registered Nurse) on 23 at 0831  Med List Status: Partial     Medication Last Dose Status   aspirin EC (ECOTRIN) 81 MG Tablet Delayed Response  Active   atorvastatin (LIPITOR) 10 MG Tab  Active   CINNAMON PO  Active   Cyanocobalamin (VITAMIN B-12) 1000 MCG Tab  Active   DOCUSATE SODIUM PO  Active   gabapentin (NEURONTIN) 100 MG Cap  Active   glipiZIDE (GLUCOTROL) 10 MG Tab  Active   Ibuprofen 200 MG Cap  Active   insulin glargine (LANTUS) 100 UNIT/ML Solution  Active   latanoprost (XALATAN) 0.005 % Solution  Active   levothyroxine (SYNTHROID) 100 MCG Tab  Active   Pyridoxine HCl (VITAMIN B6) 100 MG Tab  Active                    ALLERGIES  Allergies   Allergen Reactions    Clindamycin Rash and Swelling     Whole body    Sulfa Drugs     Keflex Rash     \"Body rash\"    Lovaza [Omega-3-Acid Ethyl Esters] Diarrhea     acute    Naproxen Rash     \"Body rash\"    Pcn [Penicillins] Rash     \"Body rash\"    Percocet [Oxycodone-Acetaminophen] Itching     Full body     Robaxin [Kdc:Yellow " "Dye+Methocarbamol] Rash     rash    Travatan [Travoprost] Itching and Swelling    Robaxin [Methocarbamol]     Hydrocodone Itching       PHYSICAL EXAM  VITAL SIGNS: /61   Pulse 88   Temp 36.8 °C (98.3 °F) (Rectal)   Resp 19   Ht 1.778 m (5' 10\")   Wt (!) 127 kg (280 lb)   SpO2 93%   BMI 40.18 kg/m²   Vitals reviewed by myself.  Physical Exam  Nursing note and vitals reviewed.  Constitutional: Elderly, pale appearing.  Mild distress.   HENT: Head is normocephalic and atraumatic. Oropharynx is clear and moist without exudate or erythema.   Eyes: Pupils are equal, round, and reactive to light. No horizontal or vertical nystagmus. Conjunctiva are normal.   Cardiovascular: Normal rate and regular rhythm. No murmur heard. Normal radial pulses.  Pulmonary/Chest: Breath sounds normal. No wheezes or rales.   Abdominal: Soft and non-tender. No distention.    Musculoskeletal: Extremities exhibit normal range of motion without edema or tenderness. Patient ambulates with a normal narrow-based steady gait.   Neurological: Awake, alert and oriented to person, place, and time. No focal deficits noted. Cranial nerves II - XII intact. No pronator drift.  No dysmetria on cerebellar testing. Normal speech and language. Normal strength and sensation in bilateral upper and lower extremities.   Skin: Skin is warm and dry. No rash.   Psychiatric: Normal mood and affect. Appropriate for clinical situation.      DIAGNOSTIC STUDIES:  LABS  Labs Reviewed   CBC WITH DIFFERENTIAL - Abnormal; Notable for the following components:       Result Value    WBC 18.3 (*)     RBC 4.15 (*)     MCHC 32.8 (*)     Neutrophils-Polys 84.40 (*)     Lymphocytes 8.70 (*)     Neutrophils (Absolute) 15.47 (*)     Monos (Absolute) 0.88 (*)     Immature Granulocytes (abs) 0.14 (*)     All other components within normal limits    Narrative:     Biotin intake of greater than 5 mg per day may interfere with  troponin levels, causing false low values.   COMP " METABOLIC PANEL - Abnormal; Notable for the following components:    Co2 17 (*)     Glucose 367 (*)     Bun 28 (*)     AST(SGOT) 168 (*)     ALT(SGPT) 70 (*)     Alkaline Phosphatase 129 (*)     Total Protein 5.8 (*)     All other components within normal limits    Narrative:     Biotin intake of greater than 5 mg per day may interfere with  troponin levels, causing false low values.   TROPONIN - Abnormal; Notable for the following components:    Troponin T 81 (*)     All other components within normal limits    Narrative:     Biotin intake of greater than 5 mg per day may interfere with  troponin levels, causing false low values.   D-DIMER - Abnormal; Notable for the following components:    D-Dimer 5.93 (*)     All other components within normal limits    Narrative:     Biotin intake of greater than 5 mg per day may interfere with  troponin levels, causing false low values.   CORRECTED CALCIUM    Narrative:     Biotin intake of greater than 5 mg per day may interfere with  troponin levels, causing false low values.   ESTIMATED GFR    Narrative:     Biotin intake of greater than 5 mg per day may interfere with  troponin levels, causing false low values.   APTT    Narrative:     Indicate which anticoagulants the patient is on:->HEPARIN   PROTHROMBIN TIME    Narrative:     Indicate which anticoagulants the patient is on:->HEPARIN   HEPARIN XA (UNFRACTIONATED)    Narrative:     Indicate which anticoagulants the patient is on:->HEPARIN       All labs reviewed and independently interpreted by myself    EKG Interpretation:    12 Lead EKG independently interpreted by myself to show:  EKG at 9:38 AM: Normal sinus rhythm, heart rate 96, left axis deviation, intervals notable for prolonged QRS of 129 with associated right bundle branch block and left anterior fascicular block, , QTc 491, no acute ST-T segment changes, left ventricular hypertrophy is present, no evidence of acute arrhythmia or ischemia per my  interpretation, no dynamic changes when compared to prior EKG from 2011    RADIOLOGY      CT-CTA CHEST PULMONARY ARTERY W/ RECONS   Final Result      1.  Extensive bilateral saddle pulmonary emboli.      2.  Severe right heart strain.            CT-HEAD W/O   Final Result      1.  Hyperostosis frontalis interna and dural calcifications.   2.  Otherwise unremarkable head CT for the patient's age.         DX-CHEST-PORTABLE (1 VIEW)   Final Result      No acute cardiac or pulmonary abnormalities are identified.        The radiologist's interpretation of all radiological studies have been reviewed by me.      COURSE & MEDICAL DECISION MAKING    ED Observation Status? Yes; I am placing the patient in to an observation status due to a diagnostic uncertainty as well as therapeutic intensity. Patient placed in observation status at 8:59 AM, 4/17/2023.     Observation plan is as follows: Follow-up labs, imaging and response to treatment    Upon Reevaluation, the patient's condition has: not improved; and will be escalated to hospitalization.    Patient discharged from ED Observation status at 10:30 AM 04/17/23.    INITIAL ASSESSMENT AND PLAN    Patient is a 73-year-old female who comes in for evaluation of syncope.  Differential diagnosis includes arrhythmia, electrolyte disturbance, vasovagal syncope, orthostatic hypotension, pulmonary embolism, DVT, closed head injury, intracranial abnormality.  Diagnostic work-up includes labs, EKG and CT of the head.       REASSESSMENTS   9:44 AM Patient's D-dimer elevated at 5.93. CTA ordered.     10:23 AM CTA showed extensive bilateral saddle pulmonary emboli    10:29 AM Patient updated on results and plan of care.     10:37 AM I discussed the patient's case and the above findings with Dr. Jones (Intensivist) who agrees to evaluate the patient for hospitalization.       ER COURSE AND FINAL DISPOSITION   Patient's initial vitals notable for slight hypoxemia with oxygen saturation of 87%,  patient has no known underlying lung disease, lungs are clear to auscultation, this is concerning for possible pulmonary embolism as the cause of her hypoxemia and syncope.  She is on 2 L nasal cannula and maintaining oxygen saturation above 90%.  EKG returns and demonstrates no evidence of acute arrhythmia or ischemia.  Chest x-ray demonstrates no acute cardiopulmonary processes.  CT of the head demonstrates no acute intracranial abnormality.    Labs returned are independently interpreted by myself to demonstrate:  -Leukocytosis of 18.3, patient is not having any infectious symptoms at this time, may be reactive to her syncopal or unresponsive episode, will continue to monitor  -Troponin is elevated at 81, concerning for heart strain, EKG is nonischemic and patient is not having chest pain, will continue to monitor  -D-dimer elevated at 5, concerning for pulmonary embolism, this may also be the cause of elevated troponin therefore CTA is ordered for further evaluation  -LFTs are mildly elevated of unclear etiology, patient is not having any discomfort in this area  -Glucose elevated at 367, patient does have a history of type 2 diabetes, she is not in DKA at this time  -Labs are otherwise unremarkable    CT imaging returns and is consistent with bilateral saddle pulmonary emboli.  Given her prolonged syncope along with evidence of heart strain and elevated troponin I did discuss the case with pulmonologist to see if she is a candidate for interventional therapy.  In the meantime I have started her on heparin therapy.  Dr. Pérez came and evaluated the patient.  After evaluation patient will be hospitalized to the [facility] with close monitoring, will continue with anticoagulation at this time.  Stat echocardiogram is ordered by myself and is pending for further determination of care.  She will be hospitalized in guarded condition.  I discussed the case with hospitalist Dr. Obrien who has accepted patient for  hospitalization.    CRITICAL CARE  The very real possibilty of a deterioration of this patient's condition required the highest level of my preparedness for sudden, emergent intervention.  I provided critical care services, which included medication orders, frequent reevaluations of the patient's condition and response to treatment, ordering and reviewing test results, and discussing the case with various consultants.  The critical care time associated with the care of the patient was 32 minutes. Review chart for interventions. This time is exclusive of any other billable procedures.      ADDITIONAL PROBLEM LIST AND RESOURCE UTILIZATION    Additional problems aside from the chief complaint that I have addressed: None    I have discussed management of the patient with the following physicians and SCOT's: Dr. Jones (Intensivist), Dr. Obrien    Discussion of management with other [facility] or appropriate source(s): None    Escalation of care considered, and ultimately not performed: See above.     Barriers to care at this time, including but not limited to: None.     Decision tools and prescription drugs considered including, but not limited to: See above.    Please see review of records as noted above    DISPOSITION:  Patient will be hospitalized by Dr. Jones in guarded condition.    FINAL IMPRESSION  1. Syncope, unspecified syncope type    2. Pulmonary embolism, other, unspecified chronicity, unspecified whether acute cor pulmonale present (HCC)          IMukesh (Scribe), am scribing for, and in the presence of, Rayna Cuevas M.D..    Electronically signed by: Mukesh Swain ([place]), 4/17/2023    Rayna ROGEL M.D. personally performed the services described in this documentation, as scribed by Mukesh Swain in my presence, and it is both accurate and complete.    The note accurately reflects work and decisions made by me.  Rayna Cuevas M.D.  4/17/2023  1:31 PM

## 2023-04-18 PROBLEM — R55 SYNCOPE: Status: ACTIVE | Noted: 2023-04-18

## 2023-04-18 LAB
ALBUMIN SERPL BCP-MCNC: 3.5 G/DL (ref 3.2–4.9)
ALBUMIN/GLOB SERPL: 1.5 G/DL
ALP SERPL-CCNC: 138 U/L (ref 30–99)
ALT SERPL-CCNC: 100 U/L (ref 2–50)
ANION GAP SERPL CALC-SCNC: 11 MMOL/L (ref 7–16)
AST SERPL-CCNC: 122 U/L (ref 12–45)
BASOPHILS # BLD AUTO: 0.5 % (ref 0–1.8)
BASOPHILS # BLD: 0.05 K/UL (ref 0–0.12)
BILIRUB SERPL-MCNC: 0.3 MG/DL (ref 0.1–1.5)
BUN SERPL-MCNC: 28 MG/DL (ref 8–22)
CALCIUM ALBUM COR SERPL-MCNC: 9.2 MG/DL (ref 8.5–10.5)
CALCIUM SERPL-MCNC: 8.8 MG/DL (ref 8.5–10.5)
CHLORIDE SERPL-SCNC: 110 MMOL/L (ref 96–112)
CO2 SERPL-SCNC: 24 MMOL/L (ref 20–33)
CREAT SERPL-MCNC: 0.91 MG/DL (ref 0.5–1.4)
EOSINOPHIL # BLD AUTO: 0.04 K/UL (ref 0–0.51)
EOSINOPHIL NFR BLD: 0.4 % (ref 0–6.9)
ERYTHROCYTE [DISTWIDTH] IN BLOOD BY AUTOMATED COUNT: 49.1 FL (ref 35.9–50)
GFR SERPLBLD CREATININE-BSD FMLA CKD-EPI: 67 ML/MIN/1.73 M 2
GLOBULIN SER CALC-MCNC: 2.4 G/DL (ref 1.9–3.5)
GLUCOSE BLD STRIP.AUTO-MCNC: 134 MG/DL (ref 65–99)
GLUCOSE BLD STRIP.AUTO-MCNC: 200 MG/DL (ref 65–99)
GLUCOSE BLD STRIP.AUTO-MCNC: 260 MG/DL (ref 65–99)
GLUCOSE BLD STRIP.AUTO-MCNC: 68 MG/DL (ref 65–99)
GLUCOSE SERPL-MCNC: 69 MG/DL (ref 65–99)
HAV IGM SERPL QL IA: NORMAL
HBV CORE IGM SER QL: NORMAL
HBV SURFACE AG SER QL: NORMAL
HCT VFR BLD AUTO: 38.3 % (ref 37–47)
HCV AB SER QL: NORMAL
HGB BLD-MCNC: 12.7 G/DL (ref 12–16)
IMM GRANULOCYTES # BLD AUTO: 0.05 K/UL (ref 0–0.11)
IMM GRANULOCYTES NFR BLD AUTO: 0.5 % (ref 0–0.9)
LYMPHOCYTES # BLD AUTO: 2.81 K/UL (ref 1–4.8)
LYMPHOCYTES NFR BLD: 28 % (ref 22–41)
MAGNESIUM SERPL-MCNC: 2.2 MG/DL (ref 1.5–2.5)
MCH RBC QN AUTO: 30.8 PG (ref 27–33)
MCHC RBC AUTO-ENTMCNC: 33.2 G/DL (ref 33.6–35)
MCV RBC AUTO: 93 FL (ref 81.4–97.8)
MONOCYTES # BLD AUTO: 0.72 K/UL (ref 0–0.85)
MONOCYTES NFR BLD AUTO: 7.2 % (ref 0–13.4)
NEUTROPHILS # BLD AUTO: 6.35 K/UL (ref 2–7.15)
NEUTROPHILS NFR BLD: 63.4 % (ref 44–72)
NRBC # BLD AUTO: 0 K/UL
NRBC BLD-RTO: 0 /100 WBC
PLATELET # BLD AUTO: 215 K/UL (ref 164–446)
PMV BLD AUTO: 11 FL (ref 9–12.9)
POTASSIUM SERPL-SCNC: 3.6 MMOL/L (ref 3.6–5.5)
PROT SERPL-MCNC: 5.9 G/DL (ref 6–8.2)
RBC # BLD AUTO: 4.12 M/UL (ref 4.2–5.4)
SODIUM SERPL-SCNC: 145 MMOL/L (ref 135–145)
WBC # BLD AUTO: 10 K/UL (ref 4.8–10.8)

## 2023-04-18 PROCEDURE — 700111 HCHG RX REV CODE 636 W/ 250 OVERRIDE (IP): Performed by: INTERNAL MEDICINE

## 2023-04-18 PROCEDURE — 82962 GLUCOSE BLOOD TEST: CPT

## 2023-04-18 PROCEDURE — 700102 HCHG RX REV CODE 250 W/ 637 OVERRIDE(OP): Performed by: INTERNAL MEDICINE

## 2023-04-18 PROCEDURE — 97530 THERAPEUTIC ACTIVITIES: CPT

## 2023-04-18 PROCEDURE — 97166 OT EVAL MOD COMPLEX 45 MIN: CPT

## 2023-04-18 PROCEDURE — A9270 NON-COVERED ITEM OR SERVICE: HCPCS | Performed by: INTERNAL MEDICINE

## 2023-04-18 PROCEDURE — 700102 HCHG RX REV CODE 250 W/ 637 OVERRIDE(OP): Performed by: HOSPITALIST

## 2023-04-18 PROCEDURE — 80074 ACUTE HEPATITIS PANEL: CPT

## 2023-04-18 PROCEDURE — A9270 NON-COVERED ITEM OR SERVICE: HCPCS | Performed by: HOSPITALIST

## 2023-04-18 PROCEDURE — 97163 PT EVAL HIGH COMPLEX 45 MIN: CPT

## 2023-04-18 PROCEDURE — 85025 COMPLETE CBC W/AUTO DIFF WBC: CPT

## 2023-04-18 PROCEDURE — 770000 HCHG ROOM/CARE - INTERMEDIATE ICU *

## 2023-04-18 PROCEDURE — 83735 ASSAY OF MAGNESIUM: CPT

## 2023-04-18 PROCEDURE — 99233 SBSQ HOSP IP/OBS HIGH 50: CPT | Performed by: HOSPITALIST

## 2023-04-18 PROCEDURE — 97162 PT EVAL MOD COMPLEX 30 MIN: CPT

## 2023-04-18 PROCEDURE — 80053 COMPREHEN METABOLIC PANEL: CPT

## 2023-04-18 RX ADMIN — ATORVASTATIN CALCIUM 10 MG: 10 TABLET, FILM COATED ORAL at 20:15

## 2023-04-18 RX ADMIN — LATANOPROST 1 DROP: 50 SOLUTION OPHTHALMIC at 20:10

## 2023-04-18 RX ADMIN — INSULIN HUMAN 1 UNITS: 100 INJECTION, SOLUTION PARENTERAL at 17:08

## 2023-04-18 RX ADMIN — ENOXAPARIN SODIUM 100 MG: 100 INJECTION SUBCUTANEOUS at 01:03

## 2023-04-18 RX ADMIN — APIXABAN 10 MG: 5 TABLET, FILM COATED ORAL at 17:07

## 2023-04-18 RX ADMIN — ACETAMINOPHEN 650 MG: 325 TABLET, FILM COATED ORAL at 16:41

## 2023-04-18 RX ADMIN — GABAPENTIN 200 MG: 100 CAPSULE ORAL at 17:07

## 2023-04-18 RX ADMIN — GABAPENTIN 200 MG: 100 CAPSULE ORAL at 05:23

## 2023-04-18 RX ADMIN — INSULIN HUMAN 3 UNITS: 100 INJECTION, SOLUTION PARENTERAL at 20:10

## 2023-04-18 RX ADMIN — INSULIN GLARGINE-YFGN 25 UNITS: 100 INJECTION, SOLUTION SUBCUTANEOUS at 20:10

## 2023-04-18 RX ADMIN — ACETAMINOPHEN 650 MG: 325 TABLET, FILM COATED ORAL at 03:53

## 2023-04-18 RX ADMIN — LEVOTHYROXINE SODIUM 100 MCG: 100 TABLET ORAL at 05:23

## 2023-04-18 RX ADMIN — GABAPENTIN 200 MG: 100 CAPSULE ORAL at 11:25

## 2023-04-18 ASSESSMENT — COGNITIVE AND FUNCTIONAL STATUS - GENERAL
STANDING UP FROM CHAIR USING ARMS: A LOT
MOVING FROM LYING ON BACK TO SITTING ON SIDE OF FLAT BED: UNABLE
SUGGESTED CMS G CODE MODIFIER MOBILITY: CM
WALKING IN HOSPITAL ROOM: A LOT
DRESSING REGULAR LOWER BODY CLOTHING: A LOT
MOBILITY SCORE: 9
MOVING TO AND FROM BED TO CHAIR: UNABLE
TURNING FROM BACK TO SIDE WHILE IN FLAT BAD: A LOT
DRESSING REGULAR UPPER BODY CLOTHING: A LITTLE
SUGGESTED CMS G CODE MODIFIER DAILY ACTIVITY: CK
DAILY ACTIVITIY SCORE: 17
TOILETING: A LITTLE
CLIMB 3 TO 5 STEPS WITH RAILING: TOTAL
PERSONAL GROOMING: A LITTLE
HELP NEEDED FOR BATHING: A LOT

## 2023-04-18 ASSESSMENT — ENCOUNTER SYMPTOMS
BLURRED VISION: 0
SHORTNESS OF BREATH: 0
SORE THROAT: 0
DIZZINESS: 0
HEMOPTYSIS: 0
CHILLS: 0
PALPITATIONS: 0
COUGH: 0
WHEEZING: 0
FEVER: 0
VOMITING: 0
BACK PAIN: 0
LOSS OF CONSCIOUSNESS: 0
ABDOMINAL PAIN: 0
DOUBLE VISION: 0
NAUSEA: 0
HEADACHES: 0
DIARRHEA: 0
SPUTUM PRODUCTION: 0

## 2023-04-18 ASSESSMENT — PATIENT HEALTH QUESTIONNAIRE - PHQ9
SUM OF ALL RESPONSES TO PHQ9 QUESTIONS 1 AND 2: 0
2. FEELING DOWN, DEPRESSED, IRRITABLE, OR HOPELESS: NOT AT ALL
1. LITTLE INTEREST OR PLEASURE IN DOING THINGS: NOT AT ALL

## 2023-04-18 ASSESSMENT — PAIN DESCRIPTION - PAIN TYPE
TYPE: ACUTE PAIN
TYPE: CHRONIC PAIN

## 2023-04-18 ASSESSMENT — ACTIVITIES OF DAILY LIVING (ADL): TOILETING: INDEPENDENT

## 2023-04-18 ASSESSMENT — GAIT ASSESSMENTS: GAIT LEVEL OF ASSIST: UNABLE TO PARTICIPATE

## 2023-04-18 NOTE — CARE PLAN
The patient is Watcher - Medium risk of patient condition declining or worsening    Shift Goals  Clinical Goals: respiratory and hemodynamic stability  Patient Goals: rest  Family Goals: MYESHA    Progress made toward(s) clinical / shift goals:      Problem: Knowledge Deficit - Standard  Goal: Patient and family/care givers will demonstrate understanding of plan of care, disease process/condition, diagnostic tests and medications  Outcome: Progressing     Problem: Skin Integrity  Goal: Skin integrity is maintained or improved  Outcome: Progressing     Problem: Fall Risk  Goal: Patient will remain free from falls  Outcome: Progressing     Problem: Pain - Standard  Goal: Alleviation of pain or a reduction in pain to the patient’s comfort goal  Outcome: Progressing       Patient is not progressing towards the following goals:

## 2023-04-18 NOTE — THERAPY
"Occupational Therapy   Initial Evaluation     Patient Name: Rosetta Moreira  Age:  73 y.o., Sex:  female  Medical Record #: [id number]  Today's Date: 4/18/2023     Precautions  Precautions: (P) Fall Risk    Assessment  Patient is 73 y.o. female who presents to acute w/ syncope. PMH includes pain in R leg, in ED found to have B PE's w/ R heart strain. Pt endorses multiple GLF's at home. Typically lives alone. Today she required mod A for STS and SPT to BSC, min A for toilet hygiene. Pt demo'd impaired balance, functional mobility, and activity tolerance impacting functional independence. Will continue to follow while in house.     Plan    Occupational Therapy Initial Treatment Plan   Treatment Interventions: (P) Self Care / Activities of Daily Living, Adaptive Equipment, Neuro Re-Education / Balance, Therapeutic Exercises, Therapeutic Activity  Treatment Frequency: (P) 3 Times per Week  Duration: (P) Until Therapy Goals Met    DC Equipment Recommendations: (P) None  Discharge Recommendations: (P) Recommend post-acute placement for additional occupational therapy services prior to discharge home     Subjective    \"The  was SO attractive on the helicopter\"      Objective       04/18/23 1410   Charge Group   OT Evaluation OT Evaluation Mod   Total Time Spent   OT Evaluation (Minutes) 40   Initial Contact Note    Initial Contact Note Order Received and Verified, Occupational Therapy Evaluation in Progress with Full Report to Follow.   Prior Living Situation   Prior Services None   Housing / Facility 1 Story House   Bathroom Set up Walk In Shower;Shower Chair;Grab Bars   Equipment Owned Front-Wheel Walker;Single Point Cane;Tub / Shower Seat;Grab Bar(s) In Tub / Shower;Grab Bar(s) By Toilet   Lives with - Patient's Self Care Capacity Alone and Able to Care For Self   Comments Pts spouse is in LTC side of [facility], reports he has been there for ~1 year. Appears to have limited local supports, was supposed to get a " TKA in 7/23   Prior Level of ADL Function   Self Feeding Independent   Grooming / Hygiene Independent   Bathing Independent   Dressing Independent   Toileting Independent   Prior Level of IADL Function   Medication Management Independent   Laundry Independent   Kitchen Mobility Independent   Finances Independent   Home Management Independent   Shopping Independent   Prior Level Of Mobility Independent With Device in Community;Independent With Device in Home   Driving / Transportation Driving Independent   Precautions   Precautions Fall Risk   Vitals   O2 Delivery Device Room air w/o2 available   Pain 0 - 10 Group   Therapist Pain Assessment Nurse Notified;During Activity  (c/o pain in R LE, not quantified, agreeable)   Cognition    Cognition / Consciousness WDL   Level of Consciousness Alert   Comments very pleasent and cooperative   Active ROM Upper Body   Comments WFL   Strength Upper Body   Comments Generalized weakness, equal bilaterally, will require strengthening for FWW use   Coordination Upper Body   Coordination WDL   Balance Assessment   Sitting Balance (Static) Fair   Sitting Balance (Dynamic) Fair -   Standing Balance (Static) Poor +   Standing Balance (Dynamic) Poor   Weight Shift Sitting Fair   Weight Shift Standing Poor   Comments w/ FWW   Bed Mobility    Supine to Sit Minimal Assist   Sit to Supine   (NT in chair post)   Scooting Supervised   Comments HOB raised, rails used   ADL Assessment   Grooming Supervision;Seated   Lower Body Dressing Maximal Assist  (socks)   Toileting Minimal Assist  (voided in BSC)   How much help from another person does the patient currently need...   Putting on and taking off regular lower body clothing? 2   Bathing (including washing, rinsing, and drying)? 2   Toileting, which includes using a toilet, bedpan, or urinal? 3   Putting on and taking off regular upper body clothing? 3   Taking care of personal grooming such as brushing teeth? 3   Eating meals? 4   6 Clicks  Daily Activity Score 17   Functional Mobility   Sit to Stand Moderate Assist   Bed, Chair, Wheelchair Transfer Moderate Assist   Toilet Transfers Moderate Assist  (BSC)   Mobility SPT from EOB >BSC> Chair w/ FWW   Activity Tolerance   Sitting in Chair 10 min + (up post)   Sitting Edge of Bed 10 min   Standing 4 min total   Patient / Family Goals   Patient / Family Goal #1 To get stronger   Short Term Goals   Short Term Goal # 1 Pt will demo LB dressing w/ SPV   Short Term Goal # 2 Pt will demo toilet txf w/ SPV   Short Term Goal # 3 Pt will demo standing grooming w/ SPV   Short Term Goal # 4 Pt will demo toilet hygiene w/ SPV   Education Group   Role of Occupational Therapist Patient Response Patient;Acceptance;Explanation;Demonstration;Verbal Demonstration;Action Demonstration   Occupational Therapy Initial Treatment Plan    Treatment Interventions Self Care / Activities of Daily Living;Adaptive Equipment;Neuro Re-Education / Balance;Therapeutic Exercises;Therapeutic Activity   Treatment Frequency 3 Times per Week   Duration Until Therapy Goals Met   Problem List   Problem List Decreased Active Daily Living Skills;Decreased Homemaking Skills;Decreased Functional Mobility;Decreased Activity Tolerance;Impaired Postural Control / Balance   Anticipated Discharge Equipment and Recommendations   DC Equipment Recommendations None   Discharge Recommendations Recommend post-acute placement for additional occupational therapy services prior to discharge home   Interdisciplinary Plan of Care Collaboration   IDT Collaboration with  Nursing;Physical Therapist   Patient Position at End of Therapy Seated;Chair Alarm On;Call Light within Reach;Tray Table within Reach;Phone within Reach;Family / Friend in Room   Collaboration Comments report given   Session Information   Date / Session Number  4/18, 1 (1/3, 4/24)

## 2023-04-18 NOTE — ASSESSMENT & PLAN NOTE
Normal in  2021 which is our most recent comparison  Suspect secondary to obstructive shock from PE  Stable today      Slowly trending down

## 2023-04-18 NOTE — ASSESSMENT & PLAN NOTE
glragine 25units  Increase to mod SSI   Carb consistent diet  As outpt is on   -lantus 30u  -glipized 10

## 2023-04-18 NOTE — PROGRESS NOTES
"Hospital Medicine Daily Progress Note    Date of Service  4/18/2023    Chief Complaint  Rosetta Moreira is a 73 y.o. female admitted 4/17/2023 with syncope    Hospital Course  74yo PMHx DM, glaucoma, hypothyroid.  Presented with syncope.  Had been having pain in her R leg.  In the ED found to have B PE's with evidence of R heart strain.  Went for EKOS on    Interval Problem Update  Feels \"OK\" today.  No SOB while she's in bed but hasn't been up this am.  Some R knee pain which is chronic for her    Long d/w pt RE PE, DVT, anticoagulation including risks and benefits x 33mins    AFebrile  Sinus 80s  SBP 120s  On RA    I have discussed this patient's plan of care and discharge plan at IDT rounds today with Case Management, Nursing, Nursing leadership, and other members of the IDT team.    Consultants/Specialty  pulmonary      Code Status  Full Code    Disposition  Patient is not medically cleared for discharge.   Anticipate discharge to to home with close outpatient follow-up.  I have placed the appropriate orders for post-discharge needs.    Review of Systems  Review of Systems   Constitutional:  Negative for chills and fever.   HENT:  Negative for nosebleeds and sore throat.    Eyes:  Negative for blurred vision and double vision.   Respiratory:  Negative for cough, hemoptysis, sputum production, shortness of breath and wheezing.    Cardiovascular:  Negative for chest pain, palpitations and leg swelling.   Gastrointestinal:  Negative for abdominal pain, diarrhea, nausea and vomiting.   Genitourinary:  Negative for dysuria and urgency.   Musculoskeletal:  Negative for back pain.   Skin:  Negative for rash.   Neurological:  Negative for dizziness, loss of consciousness and headaches.      Physical Exam  Temp:  [35.6 °C (96.1 °F)-36.8 °C (98.3 °F)] (P) 35.8 °C (96.5 °F)  Pulse:  [] 78  Resp:  [15-57] 16  BP: (101-153)/(54-94) 123/76  SpO2:  [87 %-98 %] 93 %    Physical Exam  Constitutional:       General: She " is not in acute distress.     Appearance: Normal appearance. She is well-developed and underweight. She is not diaphoretic.   HENT:      Head: Normocephalic and atraumatic.   Neck:      Vascular: No JVD.   Cardiovascular:      Rate and Rhythm: Normal rate and regular rhythm.      Heart sounds: No murmur heard.  Pulmonary:      Effort: Pulmonary effort is normal. No respiratory distress.      Breath sounds: No stridor. No wheezing or rales.   Abdominal:      Palpations: Abdomen is soft.      Tenderness: There is no abdominal tenderness. There is no guarding or rebound.   Musculoskeletal:         General: No tenderness.      Right lower leg: No edema.      Left lower leg: No edema.   Skin:     General: Skin is warm and dry.      Capillary Refill: Capillary refill takes less than 2 seconds.      Findings: No rash.   Neurological:      General: No focal deficit present.      Mental Status: She is alert and oriented to person, place, and time.   Psychiatric:         Mood and Affect: Mood normal.         Behavior: Behavior normal.         Thought Content: Thought content normal.       Fluids    Intake/Output Summary (Last 24 hours) at 4/18/2023 0813  Last data filed at 4/18/2023 0700  Gross per 24 hour   Intake 402.99 ml   Output 400 ml   Net 2.99 ml       Laboratory  Recent Labs     04/17/23  0858 04/18/23  0411   WBC 18.3* 10.0   RBC 4.15* 4.12*   HEMOGLOBIN 12.9 12.7   HEMATOCRIT 39.3 38.3   MCV 94.7 93.0   MCH 31.1 30.8   MCHC 32.8* 33.2*   RDW 49.6 49.1   PLATELETCT 200 215   MPV 10.7 11.0     Recent Labs     04/17/23  0858 04/18/23  0411   SODIUM 140 145   POTASSIUM 4.0 3.6   CHLORIDE 108 110   CO2 17* 24   GLUCOSE 367* 69   BUN 28* 28*   CREATININE 0.93 0.91   CALCIUM 8.5 8.8     Recent Labs     04/17/23  1042   APTT 23.4*   INR 1.13               Imaging  US-EXTREMITY VENOUS LOWER BILAT   Final Result      EC-ECHOCARDIOGRAM COMPLETE W/O CONT   Final Result      CT-CTA CHEST PULMONARY ARTERY W/ RECONS   Final  Result      1.  Extensive bilateral saddle pulmonary emboli.      2.  Severe right heart strain.            CT-HEAD W/O   Final Result      1.  Hyperostosis frontalis interna and dural calcifications.   2.  Otherwise unremarkable head CT for the patient's age.         DX-CHEST-PORTABLE (1 VIEW)   Final Result      No acute cardiac or pulmonary abnormalities are identified.           Assessment/Plan  * Acute pulmonary embolism with acute cor pulmonale, unspecified pulmonary embolism type (HCC)- (present on admission)  Assessment & Plan  Patient found to have extensive bilateral saddle pulmonary emboli with severe right heart strain.   Elevated cardiac markers.  TTE showing evidence of R heart strain   Due to multiple recent GLFs felt too high risk for thrombolytics or EKOS  Likely a triggered event from R leg and lack of activity due to knee OA  Currently on enoxaparin 1mg/kg; change to apixaban tonight  Cont Tele  Mobilize with therapy      Syncope  Assessment & Plan  Secondary to obstructive etiology: PE  Cont Tele  EKG otherwise unremarkable  TTE also otherwise unremarkable    Elevated liver enzymes  Assessment & Plan  Normal in  2021 which is our most recent comparison  Suspect secondary to obstructive shock from PE  Stable today  Repeat CMP tomorrow; if not dropping will entertain work up for other etiologies    Advance care planning  Assessment & Plan  DW my partner on admission    Type 2 diabetes mellitus (HCC)  Assessment & Plan  glragine 25units  SSI  Carb consistent diet  As outpt is on   -lantus 30u  -glipized 10    Acute deep vein thrombosis (DVT) of popliteal vein of right lower extremity (HCC)  Assessment & Plan  Patient underwent ultrasound DVT and she found to have acute to subacute, occlusive thrombus in the right popliteal vein, right gastroc vein, right posterior tibial vein and right peroneal vein.  Patient reported she has not been moving her right lower extremity due to recent injury and  pain.  enoxabaprin 1mg/kg at present and transition to apixaban this evening           VTE prophylaxis: therapeutic anticoagulation with enoxaparin    I have performed a physical exam and reviewed and updated ROS and Plan today (4/18/2023). In review of yesterday's note (4/17/2023), there are no changes except as documented above.

## 2023-04-18 NOTE — CARE PLAN
The patient is Stable - Low risk of patient condition declining or worsening    Shift Goals  Clinical Goals: pain control, PT/OT eval,BG control  Patient Goals: pain control, talk with Case Mgmt  Family Goals: MYESHA    Progress made toward(s) clinical / shift goals: Pt is up to chair and BG has been within range, no complaints of pain    Patient is not progressing towards the following goals:

## 2023-04-18 NOTE — ASSESSMENT & PLAN NOTE
Patient underwent ultrasound DVT and she found to have acute to subacute, occlusive thrombus in the right popliteal vein, right gastroc vein, right posterior tibial vein and right peroneal vein.  Patient reported she has not been moving her right lower extremity due to recent injury and pain.  enoxabaprin 1mg/kg at present and transition to apixaban this evening  Cont on eliquis

## 2023-04-18 NOTE — DISCHARGE PLANNING
1253-  Received Choice Form @: 1234   Agency/Facility Name: [facility]  Referral Sent per Choice Form @: 1250    [facility] unable to send through Epic. Referral sent through .

## 2023-04-18 NOTE — DISCHARGE PLANNING
Care Transition Team Assessment    RNCM met with patient at bedside. Patient is from [place], [place] and would like to go to [facility]. Patient stated her  is current a patient there as well.  Patient has ACP docs on file. Patient does not have DME or use O2 at home. RNCM will fax choice form to [facility] and follow up with referral.     Information Source  Orientation Level: Oriented X4  Information Given By: Patient  Who is responsible for making decisions for patient? : Patient    Readmission Evaluation  Is this a readmission?: No    Elopement Risk  Legal Hold: No  Ambulatory or Self Mobile in Wheelchair: No-Not an Elopement Risk  Elopement Risk: Not at Risk for Elopement    Interdisciplinary Discharge Planning  Patient or legal guardian wants to designate a caregiver: No    Discharge Preparedness  What is your plan after discharge?: Skilled nursing facility  What are your discharge supports?: Child, Sibling  Prior Functional Level: Ambulatory, Independent with Activities of Daily Living, Independent with Medication Management  Difficulity with ADLs: None  Difficulity with IADLs: None    Functional Assesment  Prior Functional Level: Ambulatory, Independent with Activities of Daily Living, Independent with Medication Management    Finances  Financial Barriers to Discharge: No  Prescription Coverage: Yes    Vision / Hearing Impairment  Vision Impairment : Yes  Right Eye Vision: Wears Glasses  Left Eye Vision: Wears Glasses  Hearing Impairment : No         Advance Directive  Advance Directive?: DPOA for Health Care    Domestic Abuse  Have you ever been the victim of abuse or violence?: No  Physical Abuse or Sexual Abuse: No  Verbal Abuse or Emotional Abuse: No  Possible Abuse/Neglect Reported to:: Not Applicable    Psychological Assessment  History of Substance Abuse: None  History of Psychiatric Problems: No  Non-compliant with Treatment: No  Newly Diagnosed Illness: Yes    Discharge Risks or  Barriers  Discharge risks or barriers?: Transportation, Post-acute placement / services  Patient risk factors: Vulnerable adult    Anticipated Discharge Information  Discharge Disposition: D/T to SNF with Medicare cert in anticipation of skilled care (03)

## 2023-04-18 NOTE — ASSESSMENT & PLAN NOTE
Secondary to obstructive etiology: PE  Cont Tele  EKG otherwise unremarkable  TTE also otherwise unremarkable    4/19  Tele remains unremarkable  Up and ambulating to the bathroom with no issues

## 2023-04-18 NOTE — ASSESSMENT & PLAN NOTE
Patient found to have extensive bilateral saddle pulmonary emboli with severe right heart strain.   Elevated cardiac markers.  TTE showing evidence of R heart strain   Due to multiple recent GLFs felt too high risk for thrombolytics or EKOS  Likely a triggered event from R leg and lack of activity due to knee OA  Currently on enoxaparin 1mg/kg; change to apixaban tonight  Cont Tele  Mobilize with therapy    4/19  Now on apixaban and tolerating well  On RA  Pressures are now increasing implying improved cardiac function  Cont Tele  4/20 cont on eliquis sating well on RA

## 2023-04-18 NOTE — CARE PLAN
Problem: Knowledge Deficit - Standard  Goal: Patient and family/care givers will demonstrate understanding of plan of care, disease process/condition, diagnostic tests and medications  Outcome: Progressing     Problem: Skin Integrity  Goal: Skin integrity is maintained or improved  Outcome: Progressing     Problem: Fall Risk  Goal: Patient will remain free from falls  Outcome: Progressing   The patient is Watcher - Medium risk of patient condition declining or worsening    Shift Goals  Clinical Goals: New admit    Progress made toward(s) clinical / shift goals:  Pt turning herself in bed. Bed alarm on. Patient resting in bed.

## 2023-04-18 NOTE — THERAPY
Physical Therapy   Initial Evaluation     Patient Name: Rosetta Moreira  Age:  73 y.o., Sex:  female  Medical Record #: [id number]  Today's Date: 4/18/2023     Precautions  Precautions: Fall Risk    Assessment  Patient is 73 y.o. female admitted after syncopal episode x 2 at home, admitted with extensive bilateral saddle pulmonary emboli with acute cor pulmonale and acute to subacute popliteal DVT.  PMH includes RLE pain & weakness (follows with outpatient ortho) and diabetes.  Today patient presented with impaired strength, balance, and activity tolerance which limit safety & independence with functional mobility.  She required mod A for OOB mobility including stand pivot transfers to BSC and chair.  Patient is very fearful of falling & requires cues and encouragement to continue task without stopping.  Patient is below her functional baseline, recommend post acute placement.    Therapeutic activity performed after formal evaluation including: cues for sequencing, facilitation of weight shifting, transfers, standing balance, and compensatory strategies.    Plan    Physical Therapy Initial Treatment Plan   Treatment Plan : Bed Mobility, Equipment, Gait Training, Neuro Re-Education / Balance, Self Care / Home Evaluation, Stair Training, Therapeutic Activities, Therapeutic Exercise  Treatment Frequency: 3 Times per Week  Duration: Until Therapy Goals Met    DC Equipment Recommendations: Unable to determine at this time  Discharge Recommendations: Recommend post-acute placement for additional physical therapy services prior to discharge home     Objective     04/18/23 1403   Precautions   Precautions Fall Risk   Pain 0 - 10 Group   Therapist Pain Assessment Post Activity Pain Same as Prior to Activity;Nurse Notified (Not quantified)   Prior Living Situation   Prior Services None   Housing / Facility 1 Story House   Steps Into Home 7   Steps In Home 2   Rail Both Rail (Steps into Home)   Equipment Owned Front-Wheel  Walker;Single Point Cane   Lives with - Patient's Self Care Capacity Alone and Able to Care For Self   Comments Pt's spouse lives in LTC in [facility].  Step daughter present & supportive however lives in [place]   Prior Level of Functional Mobility   Bed Mobility Independent   Transfer Status Independent   Ambulation Independent   Assistive Devices Used Front-Wheel Walker   Stairs Independent   History of Falls   History of Falls Yes   Cognition    Cognition / Consciousness WDL   Level of Consciousness Alert   Comments Very pleasant & cooperative   Active ROM Lower Body    Active ROM Lower Body  X   Comments WFL with functional mobility, R knee extension lacking approx. 50+ degrees   Strength Lower Body   Lower Body Strength  X   Comments LLE 4+/5,  RLE grossly 3/5 except knee ext 2+/5 due to lacking ~50+ degrees extension, however no buckling in standing   Sensation Lower Body   Lower Extremity Sensation   X   Comments Endorsed peripheral neuropathy due to diabetes   Other Treatments   Other Treatments Provided Therapeutic activity performed after formal evaluation including: cues for sequencing, facilitation of weight shifting, transfers, standing balance, and compensatory strategies   Balance Assessment   Sitting Balance (Static) Fair   Sitting Balance (Dynamic) Fair -   Standing Balance (Static) Poor +   Standing Balance (Dynamic) Poor   Weight Shift Sitting Fair   Weight Shift Standing Poor   Bed Mobility    Supine to Sit Minimal Assist   Sit to Supine (NT, left up in chair)   Scooting Supervised   Comments w/ bed features   Gait Analysis   Gait Level Of Assist Unable to Participate   Functional Mobility   Sit to Stand Moderate Assist   Bed, Chair, Wheelchair Transfer Moderate Assist (x 2 people for safety)   Toilet Transfers Moderate Assist   Transfer Method Stand Pivot   Mobility pivot EOB > BSC > chair   Comments pt tends to panic with transitional movements, 2 person assist for safety   Activity Tolerance    Sitting in Chair Post session   Sitting Edge of Bed 10 min   Standing 4-5 min total   Short Term Goals    Short Term Goal # 1 Pt will perform supine <> sit without bed features with SPV in 6 visits to progress bed mobility   Short Term Goal # 2 Pt will perform STS/functional transfers with FWW and SPV in 6 visits to progress OOB mobility   Short Term Goal # 3 Pt will ambulate 75 ft with FWW and mod A in 6 visits to initiate gait training

## 2023-04-19 LAB
ALBUMIN SERPL BCP-MCNC: 3.5 G/DL (ref 3.2–4.9)
ALBUMIN/GLOB SERPL: 1.7 G/DL
ALP SERPL-CCNC: 123 U/L (ref 30–99)
ALT SERPL-CCNC: 84 U/L (ref 2–50)
ANION GAP SERPL CALC-SCNC: 9 MMOL/L (ref 7–16)
AST SERPL-CCNC: 78 U/L (ref 12–45)
BILIRUB SERPL-MCNC: 0.3 MG/DL (ref 0.1–1.5)
BUN SERPL-MCNC: 22 MG/DL (ref 8–22)
CALCIUM ALBUM COR SERPL-MCNC: 9.1 MG/DL (ref 8.5–10.5)
CALCIUM SERPL-MCNC: 8.7 MG/DL (ref 8.5–10.5)
CHLORIDE SERPL-SCNC: 112 MMOL/L (ref 96–112)
CO2 SERPL-SCNC: 22 MMOL/L (ref 20–33)
CREAT SERPL-MCNC: 0.87 MG/DL (ref 0.5–1.4)
GFR SERPLBLD CREATININE-BSD FMLA CKD-EPI: 70 ML/MIN/1.73 M 2
GLOBULIN SER CALC-MCNC: 2.1 G/DL (ref 1.9–3.5)
GLUCOSE BLD STRIP.AUTO-MCNC: 102 MG/DL (ref 65–99)
GLUCOSE BLD STRIP.AUTO-MCNC: 197 MG/DL (ref 65–99)
GLUCOSE BLD STRIP.AUTO-MCNC: 216 MG/DL (ref 65–99)
GLUCOSE BLD STRIP.AUTO-MCNC: 242 MG/DL (ref 65–99)
GLUCOSE BLD STRIP.AUTO-MCNC: 310 MG/DL (ref 65–99)
GLUCOSE SERPL-MCNC: 104 MG/DL (ref 65–99)
POTASSIUM SERPL-SCNC: 3.8 MMOL/L (ref 3.6–5.5)
PROT SERPL-MCNC: 5.6 G/DL (ref 6–8.2)
SODIUM SERPL-SCNC: 143 MMOL/L (ref 135–145)

## 2023-04-19 PROCEDURE — 770020 HCHG ROOM/CARE - TELE (206)

## 2023-04-19 PROCEDURE — 700102 HCHG RX REV CODE 250 W/ 637 OVERRIDE(OP): Performed by: HOSPITALIST

## 2023-04-19 PROCEDURE — 99232 SBSQ HOSP IP/OBS MODERATE 35: CPT | Performed by: HOSPITALIST

## 2023-04-19 PROCEDURE — 700102 HCHG RX REV CODE 250 W/ 637 OVERRIDE(OP): Performed by: INTERNAL MEDICINE

## 2023-04-19 PROCEDURE — 80053 COMPREHEN METABOLIC PANEL: CPT

## 2023-04-19 PROCEDURE — A9270 NON-COVERED ITEM OR SERVICE: HCPCS | Performed by: INTERNAL MEDICINE

## 2023-04-19 PROCEDURE — 700101 HCHG RX REV CODE 250: Performed by: HOSPITALIST

## 2023-04-19 PROCEDURE — A9270 NON-COVERED ITEM OR SERVICE: HCPCS | Performed by: HOSPITALIST

## 2023-04-19 PROCEDURE — 82962 GLUCOSE BLOOD TEST: CPT

## 2023-04-19 RX ORDER — LABETALOL HYDROCHLORIDE 5 MG/ML
10 INJECTION, SOLUTION INTRAVENOUS EVERY 4 HOURS PRN
Status: DISCONTINUED | OUTPATIENT
Start: 2023-04-19 | End: 2023-04-24 | Stop reason: HOSPADM

## 2023-04-19 RX ADMIN — DOCUSATE SODIUM 50 MG AND SENNOSIDES 8.6 MG 2 TABLET: 8.6; 5 TABLET, FILM COATED ORAL at 05:59

## 2023-04-19 RX ADMIN — INSULIN HUMAN 1 UNITS: 100 INJECTION, SOLUTION PARENTERAL at 12:40

## 2023-04-19 RX ADMIN — GABAPENTIN 200 MG: 100 CAPSULE ORAL at 05:59

## 2023-04-19 RX ADMIN — ACETAMINOPHEN 650 MG: 325 TABLET, FILM COATED ORAL at 21:02

## 2023-04-19 RX ADMIN — ACETAMINOPHEN 650 MG: 325 TABLET, FILM COATED ORAL at 04:02

## 2023-04-19 RX ADMIN — ACETAMINOPHEN 650 MG: 325 TABLET, FILM COATED ORAL at 10:35

## 2023-04-19 RX ADMIN — APIXABAN 10 MG: 5 TABLET, FILM COATED ORAL at 17:18

## 2023-04-19 RX ADMIN — APIXABAN 10 MG: 5 TABLET, FILM COATED ORAL at 05:59

## 2023-04-19 RX ADMIN — ATORVASTATIN CALCIUM 10 MG: 10 TABLET, FILM COATED ORAL at 21:03

## 2023-04-19 RX ADMIN — DOCUSATE SODIUM 50 MG AND SENNOSIDES 8.6 MG 2 TABLET: 8.6; 5 TABLET, FILM COATED ORAL at 17:18

## 2023-04-19 RX ADMIN — GABAPENTIN 200 MG: 100 CAPSULE ORAL at 12:38

## 2023-04-19 RX ADMIN — INSULIN GLARGINE-YFGN 25 UNITS: 100 INJECTION, SOLUTION SUBCUTANEOUS at 21:14

## 2023-04-19 RX ADMIN — INSULIN HUMAN 3 UNITS: 100 INJECTION, SOLUTION PARENTERAL at 17:18

## 2023-04-19 RX ADMIN — LEVOTHYROXINE SODIUM 100 MCG: 100 TABLET ORAL at 05:59

## 2023-04-19 RX ADMIN — LABETALOL HYDROCHLORIDE 10 MG: 5 INJECTION INTRAVENOUS at 10:26

## 2023-04-19 RX ADMIN — LATANOPROST 1 DROP: 50 SOLUTION OPHTHALMIC at 22:16

## 2023-04-19 RX ADMIN — GABAPENTIN 200 MG: 100 CAPSULE ORAL at 17:18

## 2023-04-19 RX ADMIN — INSULIN HUMAN 3 UNITS: 100 INJECTION, SOLUTION PARENTERAL at 21:18

## 2023-04-19 ASSESSMENT — COGNITIVE AND FUNCTIONAL STATUS - GENERAL
DAILY ACTIVITIY SCORE: 24
MOBILITY SCORE: 22
CLIMB 3 TO 5 STEPS WITH RAILING: A LITTLE
SUGGESTED CMS G CODE MODIFIER MOBILITY: CJ
WALKING IN HOSPITAL ROOM: A LITTLE
SUGGESTED CMS G CODE MODIFIER DAILY ACTIVITY: CH

## 2023-04-19 ASSESSMENT — FIBROSIS 4 INDEX
FIB4 SCORE: 4.14
FIB4 SCORE: 2.89

## 2023-04-19 ASSESSMENT — PAIN DESCRIPTION - PAIN TYPE
TYPE: CHRONIC PAIN
TYPE: ACUTE PAIN
TYPE: CHRONIC PAIN
TYPE: ACUTE PAIN

## 2023-04-19 ASSESSMENT — ENCOUNTER SYMPTOMS
VOMITING: 0
FEVER: 0
NAUSEA: 0
PALPITATIONS: 0
ABDOMINAL PAIN: 0
SHORTNESS OF BREATH: 0
WHEEZING: 0
CHILLS: 0
SPUTUM PRODUCTION: 0
COUGH: 0
HEMOPTYSIS: 0
HEADACHES: 0
LOSS OF CONSCIOUSNESS: 0
BACK PAIN: 0
DIARRHEA: 0
DIZZINESS: 0

## 2023-04-19 NOTE — PROGRESS NOTES
4 Eyes Skin Assessment Completed by ENID Sprague and Ismael RN.    Head WDL  Ears WDL  Nose WDL  Mouth WDL  Neck WDL  Breast/Chest WDL  Shoulder Blades WDL  Spine WDL  (R) Arm/Elbow/Hand WDL  (L) Arm/Elbow/Hand WDL  Abdomen WDL  Groin WDL  Scrotum/Coccyx/Buttocks WDL  (R) Leg WDL  (L) Leg WDL  (R) Heel/Foot/Toe WDL  (L) Heel/Foot/Toe WDL          Devices In Places Tele Box and Pulse Ox      Interventions In Place Pillows    Possible Skin Injury No    Pictures Uploaded Into Epic N/A  Wound Consult Placed N/A  RN Wound Prevention Protocol Ordered No

## 2023-04-19 NOTE — DISCHARGE PLANNING
Case Management Discharge Planning    Admission Date: 4/17/2023  GMLOS: 3.9  ALOS: 2    6-Clicks ADL Score: 17  6-Clicks Mobility Score: 9  PT and/or OT Eval ordered: Yes  Post-acute Referrals Ordered: Yes  Post-acute Choice Obtained: Yes  Has referral(s) been sent to post-acute provider:  Yes      Anticipated Discharge Dispo: Discharge Disposition: D/T to SNF with Medicare cert in anticipation of skilled care (03)    DME Needed: No    Action(s) Taken: RNCM placed call to [facility] and left message for Hannah in regards to bed availability.     Escalations Completed: None    Medically Clear: Yes    Next Steps: HCM will continue to follow up with Hannah and arrange transportation as needed if bed is available.     Barriers to Discharge: Pending Placement and Transportation    Is the patient up for discharge tomorrow: patient will discharge once bed is available and transportation is arranged.       1113 RNCM placed another call to Hannah regarding bed availability and left VM.    1559 RNCM received call from Hannah who stated that the [facility] does not currently have a bed available. She can be reached at [phone], however she does not handle skilled nursing at the [facility].

## 2023-04-19 NOTE — CARE PLAN
The patient is Stable - Low risk of patient condition declining or worsening    Shift Goals  Clinical Goals: VSS  Patient Goals: Rest, DC POC  Family Goals: eulogio    Progress made toward(s) clinical / shift goals:        Problem: Knowledge Deficit - Standard  Goal: Patient and family/care givers will demonstrate understanding of plan of care, disease process/condition, diagnostic tests and medications  Outcome: Progressing     Problem: Skin Integrity  Goal: Skin integrity is maintained or improved  Outcome: Progressing     Problem: Fall Risk  Goal: Patient will remain free from falls  Outcome: Progressing     Problem: Pain - Standard  Goal: Alleviation of pain or a reduction in pain to the patient’s comfort goal  Outcome: Progressing     Problem: Mobility  Goal: Patient's capacity to carry out activities will improve  Outcome: Progressing     Problem: Self Care  Goal: Patient will have the ability to perform ADLs independently or with assistance (bathe, groom, dress, toilet and feed)  Outcome: Progressing       Patient is not progressing towards the following goals:

## 2023-04-19 NOTE — PROGRESS NOTES
[facility] Medicine Daily Progress Note    Date of Service  4/19/2023    Chief Complaint  Rosetta Moreira is a 73 y.o. female admitted 4/17/2023 with syncope    Hospital Course  72yo PMHx DM, glaucoma, hypothyroid.  Presented with syncope.  Had been having pain in her R leg.  In the ED found to have B PE's with evidence of R heart strain.  Went for EKOS on    Interval Problem Update  No complaints this am.  Up to bathroom, did not feel winded or dizzy  Still having pain in R leg but feels this is her chronic pain    AFebrile  Sinus 70s  -150s  On RA    I have discussed this patient's plan of care and discharge plan at IDT rounds today with Case Management, Nursing, Nursing leadership, and other members of the IDT team.    Consultants/Specialty  pulmonary      Code Status  Full Code    Disposition  Patient is not medically cleared for discharge.   Anticipate discharge to to home with close outpatient follow-up.  I have placed the appropriate orders for post-discharge needs.    Review of Systems  Review of Systems   Constitutional:  Negative for chills and fever.   HENT:  Positive for nosebleeds.    Respiratory:  Negative for cough, hemoptysis, sputum production, shortness of breath and wheezing.    Cardiovascular:  Negative for chest pain, palpitations and leg swelling.   Gastrointestinal:  Negative for abdominal pain, diarrhea, nausea and vomiting.   Genitourinary:  Negative for dysuria and urgency.   Musculoskeletal:  Negative for back pain.   Neurological:  Negative for dizziness, loss of consciousness and headaches.      Physical Exam  Temp:  [35.8 °C (96.5 °F)-36.2 °C (97.2 °F)] 36.1 °C (96.9 °F)  Pulse:  [] 70  Resp:  [14-37] 14  BP: (101-157)/(60-78) 157/78  SpO2:  [88 %-96 %] 96 %    Physical Exam  Constitutional:       General: She is not in acute distress.     Appearance: Normal appearance. She is well-developed and underweight. She is not diaphoretic.   HENT:      Head: Normocephalic and  atraumatic.   Neck:      Vascular: No JVD.   Cardiovascular:      Rate and Rhythm: Normal rate and regular rhythm.      Heart sounds: No murmur heard.  Pulmonary:      Effort: Pulmonary effort is normal. No respiratory distress.      Breath sounds: No stridor. No wheezing or rales.   Abdominal:      Palpations: Abdomen is soft.      Tenderness: There is no abdominal tenderness. There is no guarding or rebound.   Musculoskeletal:         General: No tenderness.      Right lower leg: No edema.      Left lower leg: No edema.   Skin:     General: Skin is warm and dry.      Capillary Refill: Capillary refill takes less than 2 seconds.      Coloration: Skin is not jaundiced or pale.      Findings: No rash.   Neurological:      General: No focal deficit present.      Mental Status: She is alert and oriented to person, place, and time. Mental status is at baseline.   Psychiatric:         Mood and Affect: Mood normal.         Behavior: Behavior normal.         Thought Content: Thought content normal.       Fluids    Intake/Output Summary (Last 24 hours) at 4/19/2023 0714  Last data filed at 4/18/2023 2000  Gross per 24 hour   Intake 240 ml   Output 700 ml   Net -460 ml         Laboratory  Recent Labs     04/17/23  0858 04/18/23  0411   WBC 18.3* 10.0   RBC 4.15* 4.12*   HEMOGLOBIN 12.9 12.7   HEMATOCRIT 39.3 38.3   MCV 94.7 93.0   MCH 31.1 30.8   MCHC 32.8* 33.2*   RDW 49.6 49.1   PLATELETCT 200 215   MPV 10.7 11.0       Recent Labs     04/17/23  0858 04/18/23  0411 04/19/23  0425   SODIUM 140 145 143   POTASSIUM 4.0 3.6 3.8   CHLORIDE 108 110 112   CO2 17* 24 22   GLUCOSE 367* 69 104*   BUN 28* 28* 22   CREATININE 0.93 0.91 0.87   CALCIUM 8.5 8.8 8.7       Recent Labs     04/17/23  1042   APTT 23.4*   INR 1.13                 Imaging  US-EXTREMITY VENOUS LOWER BILAT   Final Result      EC-ECHOCARDIOGRAM COMPLETE W/O CONT   Final Result      CT-CTA CHEST PULMONARY ARTERY W/ RECONS   Final Result      1.  Extensive bilateral  saddle pulmonary emboli.      2.  Severe right heart strain.            CT-HEAD W/O   Final Result      1.  Hyperostosis frontalis interna and dural calcifications.   2.  Otherwise unremarkable head CT for the patient's age.         DX-CHEST-PORTABLE (1 VIEW)   Final Result      No acute cardiac or pulmonary abnormalities are identified.             Assessment/Plan  * Acute pulmonary embolism with acute cor pulmonale, unspecified pulmonary embolism type (HCC)- (present on admission)  Assessment & Plan  Patient found to have extensive bilateral saddle pulmonary emboli with severe right heart strain.   Elevated cardiac markers.  TTE showing evidence of R heart strain   Due to multiple recent GLFs felt too high risk for thrombolytics or EKOS  Likely a triggered event from R leg and lack of activity due to knee OA  Currently on enoxaparin 1mg/kg; change to apixaban tonight  Cont Tele  Mobilize with therapy    4/19  Now on apixaban and tolerating well  On RA  Pressures are now increasing implying improved cardiac function  Cont Tele      Syncope  Assessment & Plan  Secondary to obstructive etiology: PE  Cont Tele  EKG otherwise unremarkable  TTE also otherwise unremarkable    4/19  Tele remains unremarkable  Up and ambulating to the bathroom with no issues    Elevated liver enzymes  Assessment & Plan  Normal in  2021 which is our most recent comparison  Suspect secondary to obstructive shock from PE  Stable today      4/19  CMP today shows downward trend of LFTs consistent with hypoperfusion injury  Cont to follow    Advance care planning  Assessment & Plan  DW my partner on admission    Type 2 diabetes mellitus (HCC)  Assessment & Plan  glragine 25units  Increase to mod SSI   Carb consistent diet  As outpt is on   -lantus 30u  -glipized 10    Acute deep vein thrombosis (DVT) of popliteal vein of right lower extremity (HCC)  Assessment & Plan  Patient underwent ultrasound DVT and she found to have acute to subacute,  occlusive thrombus in the right popliteal vein, right gastroc vein, right posterior tibial vein and right peroneal vein.  Patient reported she has not been moving her right lower extremity due to recent injury and pain.  enoxabaprin 1mg/kg at present and transition to apixaban this evening    4/19  On apixaban and off enoxaparin           VTE prophylaxis: therapeutic anticoagulation with enoxaparin    I have performed a physical exam and reviewed and updated ROS and Plan today (4/19/2023). In review of yesterday's note (4/18/2023), there are no changes except as documented above.

## 2023-04-19 NOTE — CARE PLAN
The patient is Stable - Low risk of patient condition declining or worsening    Shift Goals  Clinical Goals: increase ambulation, BG control, respiratory stability  Patient Goals: pain control, rest  Family Goals: MYESHA    Progress made toward(s) clinical / shift goals:      Problem: Knowledge Deficit - Standard  Goal: Patient and family/care givers will demonstrate understanding of plan of care, disease process/condition, diagnostic tests and medications  Outcome: Progressing     Problem: Skin Integrity  Goal: Skin integrity is maintained or improved  Outcome: Progressing     Problem: Fall Risk  Goal: Patient will remain free from falls  Outcome: Progressing     Problem: Pain - Standard  Goal: Alleviation of pain or a reduction in pain to the patient’s comfort goal  Outcome: Progressing     Problem: Mobility  Goal: Patient's capacity to carry out activities will improve  Outcome: Progressing     Problem: Self Care  Goal: Patient will have the ability to perform ADLs independently or with assistance (bathe, groom, dress, toilet and feed)  Outcome: Progressing       Patient is not progressing towards the following goals:

## 2023-04-20 LAB
GLUCOSE BLD STRIP.AUTO-MCNC: 119 MG/DL (ref 65–99)
GLUCOSE BLD STRIP.AUTO-MCNC: 184 MG/DL (ref 65–99)
GLUCOSE BLD STRIP.AUTO-MCNC: 197 MG/DL (ref 65–99)
GLUCOSE BLD STRIP.AUTO-MCNC: 231 MG/DL (ref 65–99)

## 2023-04-20 PROCEDURE — 97116 GAIT TRAINING THERAPY: CPT

## 2023-04-20 PROCEDURE — 700102 HCHG RX REV CODE 250 W/ 637 OVERRIDE(OP): Performed by: INTERNAL MEDICINE

## 2023-04-20 PROCEDURE — A9270 NON-COVERED ITEM OR SERVICE: HCPCS | Performed by: INTERNAL MEDICINE

## 2023-04-20 PROCEDURE — 770020 HCHG ROOM/CARE - TELE (206)

## 2023-04-20 PROCEDURE — 99232 SBSQ HOSP IP/OBS MODERATE 35: CPT | Performed by: INTERNAL MEDICINE

## 2023-04-20 PROCEDURE — 700102 HCHG RX REV CODE 250 W/ 637 OVERRIDE(OP): Performed by: HOSPITALIST

## 2023-04-20 PROCEDURE — 82962 GLUCOSE BLOOD TEST: CPT | Mod: 91

## 2023-04-20 PROCEDURE — A9270 NON-COVERED ITEM OR SERVICE: HCPCS | Performed by: HOSPITALIST

## 2023-04-20 PROCEDURE — 97530 THERAPEUTIC ACTIVITIES: CPT

## 2023-04-20 RX ADMIN — LEVOTHYROXINE SODIUM 100 MCG: 100 TABLET ORAL at 05:16

## 2023-04-20 RX ADMIN — LATANOPROST 1 DROP: 50 SOLUTION OPHTHALMIC at 21:36

## 2023-04-20 RX ADMIN — INSULIN HUMAN 1 UNITS: 100 INJECTION, SOLUTION PARENTERAL at 16:56

## 2023-04-20 RX ADMIN — INSULIN HUMAN 3 UNITS: 100 INJECTION, SOLUTION PARENTERAL at 21:41

## 2023-04-20 RX ADMIN — ACETAMINOPHEN 650 MG: 325 TABLET, FILM COATED ORAL at 08:44

## 2023-04-20 RX ADMIN — ATORVASTATIN CALCIUM 10 MG: 10 TABLET, FILM COATED ORAL at 21:35

## 2023-04-20 RX ADMIN — DOCUSATE SODIUM 50 MG AND SENNOSIDES 8.6 MG 2 TABLET: 8.6; 5 TABLET, FILM COATED ORAL at 16:55

## 2023-04-20 RX ADMIN — APIXABAN 10 MG: 5 TABLET, FILM COATED ORAL at 16:55

## 2023-04-20 RX ADMIN — GABAPENTIN 200 MG: 100 CAPSULE ORAL at 16:55

## 2023-04-20 RX ADMIN — APIXABAN 10 MG: 5 TABLET, FILM COATED ORAL at 05:16

## 2023-04-20 RX ADMIN — GABAPENTIN 200 MG: 100 CAPSULE ORAL at 05:16

## 2023-04-20 RX ADMIN — INSULIN HUMAN 1 UNITS: 100 INJECTION, SOLUTION PARENTERAL at 12:14

## 2023-04-20 RX ADMIN — GABAPENTIN 200 MG: 100 CAPSULE ORAL at 12:11

## 2023-04-20 RX ADMIN — ACETAMINOPHEN 650 MG: 325 TABLET, FILM COATED ORAL at 21:35

## 2023-04-20 RX ADMIN — INSULIN GLARGINE-YFGN 25 UNITS: 100 INJECTION, SOLUTION SUBCUTANEOUS at 21:39

## 2023-04-20 ASSESSMENT — ENCOUNTER SYMPTOMS
LOSS OF CONSCIOUSNESS: 0
DIARRHEA: 0
ABDOMINAL PAIN: 0
PALPITATIONS: 0
FEVER: 0
CHILLS: 0
DIZZINESS: 0
SINUS PAIN: 0
HEADACHES: 0
STRIDOR: 0
COUGH: 0
BACK PAIN: 0
NAUSEA: 0
HEMOPTYSIS: 0
WHEEZING: 0
SHORTNESS OF BREATH: 0
SPUTUM PRODUCTION: 0
VOMITING: 0

## 2023-04-20 ASSESSMENT — GAIT ASSESSMENTS
GAIT LEVEL OF ASSIST: MINIMAL ASSIST
DEVIATION: ANTALGIC;STEP TO;DECREASED BASE OF SUPPORT;BRADYKINETIC;DECREASED HEEL STRIKE;DECREASED TOE OFF
ASSISTIVE DEVICE: FRONT WHEEL WALKER
DISTANCE (FEET): 30

## 2023-04-20 ASSESSMENT — COGNITIVE AND FUNCTIONAL STATUS - GENERAL
WALKING IN HOSPITAL ROOM: A LITTLE
TURNING FROM BACK TO SIDE WHILE IN FLAT BAD: A LOT
MOVING TO AND FROM BED TO CHAIR: A LOT
MOVING FROM LYING ON BACK TO SITTING ON SIDE OF FLAT BED: A LOT
CLIMB 3 TO 5 STEPS WITH RAILING: TOTAL
SUGGESTED CMS G CODE MODIFIER MOBILITY: CL
STANDING UP FROM CHAIR USING ARMS: A LITTLE
MOBILITY SCORE: 13

## 2023-04-20 NOTE — THERAPY
Physical Therapy   Daily Treatment     Patient Name: Rosetta Moreira  Age:  73 y.o., Sex:  female  Medical Record #: [id number]  Today's Date: 4/20/2023     Precautions  Precautions: Fall Risk    Assessment  Pt seen for PT tx session. Pt demonstrated improvements in mobility and can now ambulate short distances with FWW. Pt needs increased cues throughout session for FWW management, gait, and balance as pt is nervous and needs further cues/education; pt is very receptive and applies what she is taught. Continue to recommend post acute placement at this time as pt lives alone and has 7 MICKEY. Pt is motivated to continue working with therapy and has good potential to progress. Will follow.     Plan    Treatment Plan Status: Continue Current Treatment Plan  Type of Treatment: Bed Mobility, Equipment, Gait Training, Neuro Re-Education / Balance, Self Care / Home Evaluation, Stair Training, Therapeutic Activities, Therapeutic Exercise  Treatment Frequency: 3 Times per Week  Treatment Duration: Until Therapy Goals Met    DC Equipment Recommendations: Unable to determine at this time  Discharge Recommendations: Recommend post-acute placement for additional physical therapy services prior to discharge home        Vitals   Pulse Oximetry 96 %   O2 (LPM) 0   O2 Delivery Device None - Room Air   Pain 0 - 10 Group   Location Knee   Location Orientation Right   Pain Rating Scale (NPRS)   (not quantified)   Description Aching   Therapist Pain Assessment During Activity   Balance   Sitting Balance (Static) Fair   Sitting Balance (Dynamic) Fair -   Standing Balance (Static) Fair -   Standing Balance (Dynamic) Fair -   Weight Shift Sitting Fair   Weight Shift Standing Fair   Skilled Intervention Verbal Cuing;Sequencing   Comments FWW   Bed Mobility    Supine to Sit Minimal Assist   Sit to Supine   (NT, in chair after)   Scooting Supervised   Skilled Intervention Verbal Cuing   Comments HOB slightly elevated   Gait Analysis   Gait  Level Of Assist Minimal Assist   Assistive Device Front Wheel Walker   Distance (Feet) 30   # of Times Distance was Traveled 2   Deviation Antalgic;Step To;Decreased Base Of Support;Bradykinetic;Decreased Heel Strike;Decreased Toe Off   # of Stairs Climbed 0   Weight Bearing Status no restrictions   Skilled Intervention Verbal Cuing;Sequencing   Comments cues throughout for fww management, posture, step length   Functional Mobility   Sit to Stand Contact Guard Assist   Bed, Chair, Wheelchair Transfer Contact Guard Assist   Transfer Method Stand Pivot   Mobility FWW   Comments cues for hand placement and sequencing. pt nervous, but receptive to education   How much difficulty does the patient currently have...   Turning over in bed (including adjusting bedclothes, sheets and blankets)? 2   Sitting down on and standing up from a chair with arms (e.g., wheelchair, bedside commode, etc.) 2   Moving from lying on back to sitting on the side of the bed? 2   How much help from another person does the patient currently need...   Moving to and from a bed to a chair (including a wheelchair)? 3   Need to walk in a hospital room? 3   Climbing 3-5 steps with a railing? 1   6 clicks Mobility Score 13   Activity Tolerance   Sitting in Chair 5 min/post session   Sitting Edge of Bed 10 min   Standing 10 min   Comments limited by fatigue   Short Term Goals    Short Term Goal # 1 Pt will perform supine <> sit without bed features with SPV in 6 visits to progress bed mobility   Goal Outcome # 1 Progressing as expected   Short Term Goal # 2 Pt will perform STS/functional transfers with FWW and SPV in 6 visits to progress OOB mobility   Goal Outcome # 2 Progressing as expected   Short Term Goal # 3 Pt will ambulate 75 ft with FWW and mod A in 6 visits to initiate gait training   Goal Outcome # 3 Progressing as expected   Physical Therapy Treatment Plan   Physical Therapy Treatment Plan Continue Current Treatment Plan   Anticipated  Discharge Equipment and Recommendations   DC Equipment Recommendations Unable to determine at this time   Discharge Recommendations Recommend post-acute placement for additional physical therapy services prior to discharge home   Interdisciplinary Plan of Care Collaboration   IDT Collaboration with  Nursing   Patient Position at End of Therapy Seated;Call Light within Reach;Tray Table within Reach;Phone within Reach   Collaboration Comments RN updated   Session Information   Date / Session Number  4/20- 2 (2/3, 4/24)

## 2023-04-20 NOTE — PROGRESS NOTES
Assessment completed.  Pt A&Ox4.  Pt complains of 3/10 RLE pain, medicated per MAR. POC discussed: pain control, placement; communication board updated.    Bed in locked, lowest position.  Call light and belongings within reach.  Needs met.

## 2023-04-20 NOTE — CARE PLAN
The patient is Stable - Low risk of patient condition declining or worsening    Shift Goals  Clinical Goals: Monitor vitals  Patient Goals: sleep  Family Goals: eulogio    Progress made toward(s) clinical / shift goals:    Problem: Skin Integrity  Goal: Skin integrity is maintained or improved  Outcome: Progressing     Problem: Fall Risk  Goal: Patient will remain free from falls  Outcome: Progressing     Problem: Pain - Standard  Goal: Alleviation of pain or a reduction in pain to the patient’s comfort goal  Outcome: Progressing

## 2023-04-20 NOTE — PROGRESS NOTES
[facility] Medicine Daily Progress Note    Date of Service  4/20/2023    Chief Complaint  Rosetta Moreira is a 73 y.o. female admitted 4/17/2023 with syncope    Hospital Course  74yo PMHx DM, glaucoma, hypothyroid.  Presented with syncope.  Had been having pain in her R leg.  In the ED found to have B PE's with evidence of R heart strain.  Went for EKOS on    Interval Problem Update  Patient seen and examined, afebrile, denies any chest pain or SOB   Sinus rhythm in the 70s. -147  On RA    I have discussed this patient's plan of care and discharge plan at IDT rounds today with Case Management, Nursing, Nursing leadership, and other members of the IDT team.    Consultants/Specialty  pulmonary      Code Status  Full Code    Disposition  Patient is not medically cleared for discharge.   Anticipate discharge to to home with close outpatient follow-up.  I have placed the appropriate orders for post-discharge needs.    Review of Systems  Review of Systems   Constitutional:  Negative for chills and fever.   HENT:  Positive for nosebleeds. Negative for sinus pain.    Respiratory:  Negative for cough, hemoptysis, sputum production, shortness of breath, wheezing and stridor.    Cardiovascular:  Negative for chest pain, palpitations and leg swelling.   Gastrointestinal:  Negative for abdominal pain, diarrhea, nausea and vomiting.   Genitourinary:  Negative for dysuria and urgency.   Musculoskeletal:  Negative for back pain.   Neurological:  Negative for dizziness, loss of consciousness and headaches.   All other systems reviewed and are negative.     Physical Exam  Temp:  [36.2 °C (97.2 °F)-36.6 °C (97.9 °F)] 36.5 °C (97.7 °F)  Pulse:  [64-80] 74  Resp:  [16-18] 18  BP: (142-149)/(73-78) 143/73  SpO2:  [92 %-95 %] 92 %    Physical Exam  Vitals and nursing note reviewed.   Constitutional:       General: She is not in acute distress.     Appearance: Normal appearance. She is well-developed and underweight. She is not  diaphoretic.   HENT:      Head: Normocephalic and atraumatic.   Eyes:      General: No scleral icterus.        Right eye: No discharge.         Left eye: No discharge.   Neck:      Vascular: No JVD.   Cardiovascular:      Rate and Rhythm: Normal rate and regular rhythm.      Heart sounds: No murmur heard.  Pulmonary:      Effort: Pulmonary effort is normal. No respiratory distress.      Breath sounds: No stridor. No wheezing or rales.   Abdominal:      General: There is no distension.      Palpations: Abdomen is soft.      Tenderness: There is no abdominal tenderness. There is no guarding or rebound.   Musculoskeletal:         General: No tenderness.      Right lower leg: No edema.      Left lower leg: No edema.   Skin:     General: Skin is warm and dry.      Capillary Refill: Capillary refill takes less than 2 seconds.      Coloration: Skin is not jaundiced or pale.      Findings: No rash.   Neurological:      General: No focal deficit present.      Mental Status: She is alert and oriented to person, place, and time. Mental status is at baseline.   Psychiatric:         Mood and Affect: Mood normal.         Behavior: Behavior normal.         Thought Content: Thought content normal.       Fluids    Intake/Output Summary (Last 24 hours) at 4/20/2023 1244  Last data filed at 4/19/2023 2115  Gross per 24 hour   Intake 300 ml   Output --   Net 300 ml       Laboratory  Recent Labs     04/18/23  0411   WBC 10.0   RBC 4.12*   HEMOGLOBIN 12.7   HEMATOCRIT 38.3   MCV 93.0   MCH 30.8   MCHC 33.2*   RDW 49.1   PLATELETCT 215   MPV 11.0     Recent Labs     04/18/23  0411 04/19/23  0425   SODIUM 145 143   POTASSIUM 3.6 3.8   CHLORIDE 110 112   CO2 24 22   GLUCOSE 69 104*   BUN 28* 22   CREATININE 0.91 0.87   CALCIUM 8.8 8.7                     Imaging  US-EXTREMITY VENOUS LOWER BILAT   Final Result      EC-ECHOCARDIOGRAM COMPLETE W/O CONT   Final Result      CT-CTA CHEST PULMONARY ARTERY W/ RECONS   Final Result      1.   Extensive bilateral saddle pulmonary emboli.      2.  Severe right heart strain.            CT-HEAD W/O   Final Result      1.  Hyperostosis frontalis interna and dural calcifications.   2.  Otherwise unremarkable head CT for the patient's age.         DX-CHEST-PORTABLE (1 VIEW)   Final Result      No acute cardiac or pulmonary abnormalities are identified.             Assessment/Plan  * Acute pulmonary embolism with acute cor pulmonale, unspecified pulmonary embolism type (HCC)- (present on admission)  Assessment & Plan  Patient found to have extensive bilateral saddle pulmonary emboli with severe right heart strain.   Elevated cardiac markers.  TTE showing evidence of R heart strain   Due to multiple recent GLFs felt too high risk for thrombolytics or EKOS  Likely a triggered event from R leg and lack of activity due to knee OA  Currently on enoxaparin 1mg/kg; change to apixaban tonight  Cont Tele  Mobilize with therapy    4/19  Now on apixaban and tolerating well  On RA  Pressures are now increasing implying improved cardiac function  Cont Tele  4/20 cont on eliquis sating well on RA     Syncope  Assessment & Plan  Secondary to obstructive etiology: PE  Cont Tele  EKG otherwise unremarkable  TTE also otherwise unremarkable    4/19  Tele remains unremarkable  Up and ambulating to the bathroom with no issues    Elevated liver enzymes  Assessment & Plan  Normal in  2021 which is our most recent comparison  Suspect secondary to obstructive shock from PE  Stable today      Slowly trending down     Advance care planning  Assessment & Plan  DW my partner on admission    Type 2 diabetes mellitus (HCC)  Assessment & Plan  glragine 25units  Increase to mod SSI   Carb consistent diet  As outpt is on   -lantus 30u  -glipized 10    Acute deep vein thrombosis (DVT) of popliteal vein of right lower extremity (HCC)  Assessment & Plan  Patient underwent ultrasound DVT and she found to have acute to subacute, occlusive thrombus in  the right popliteal vein, right gastroc vein, right posterior tibial vein and right peroneal vein.  Patient reported she has not been moving her right lower extremity due to recent injury and pain.  enoxabaprin 1mg/kg at present and transition to apixaban this evening  Cont on eliquis          VTE prophylaxis: therapeutic anticoagulation with enoxaparin    I have performed a physical exam and reviewed and updated ROS and Plan today (4/20/2023). In review of yesterday's note (4/19/2023), there are no changes except as documented above.

## 2023-04-20 NOTE — DISCHARGE PLANNING
0925  Agency/Facility Name: [facility] ([facility]   Outcome: DPA attempted to leave VM, admissions liaison's VM box is full. DPA to attempt to f/u at a later time.     1144  Agency/Facility Name: [facility] ([facility]   Outcome: DPA left VM regarding bed availability DPA awaiting call back.

## 2023-04-20 NOTE — CARE PLAN
The patient is Stable - Low risk of patient condition declining or worsening    Shift Goals  Clinical Goals: placement, pain control  Patient Goals: rest  Family Goals: eulogio    Progress made toward(s) clinical / shift goals:    Problem: Knowledge Deficit - Standard  Goal: Patient and family/care givers will demonstrate understanding of plan of care, disease process/condition, diagnostic tests and medications  Outcome: Progressing     Problem: Skin Integrity  Goal: Skin integrity is maintained or improved  Outcome: Progressing     Problem: Fall Risk  Goal: Patient will remain free from falls  Outcome: Progressing     Problem: Pain - Standard  Goal: Alleviation of pain or a reduction in pain to the patient’s comfort goal  Outcome: Progressing     Problem: Mobility  Goal: Patient's capacity to carry out activities will improve  Outcome: Progressing     Problem: Self Care  Goal: Patient will have the ability to perform ADLs independently or with assistance (bathe, groom, dress, toilet and feed)  Outcome: Progressing

## 2023-04-21 LAB
ANION GAP SERPL CALC-SCNC: 11 MMOL/L (ref 7–16)
BUN SERPL-MCNC: 16 MG/DL (ref 8–22)
CALCIUM SERPL-MCNC: 8.6 MG/DL (ref 8.5–10.5)
CHLORIDE SERPL-SCNC: 110 MMOL/L (ref 96–112)
CO2 SERPL-SCNC: 21 MMOL/L (ref 20–33)
CREAT SERPL-MCNC: 0.73 MG/DL (ref 0.5–1.4)
ERYTHROCYTE [DISTWIDTH] IN BLOOD BY AUTOMATED COUNT: 46 FL (ref 35.9–50)
GFR SERPLBLD CREATININE-BSD FMLA CKD-EPI: 87 ML/MIN/1.73 M 2
GLUCOSE BLD STRIP.AUTO-MCNC: 123 MG/DL (ref 65–99)
GLUCOSE BLD STRIP.AUTO-MCNC: 139 MG/DL (ref 65–99)
GLUCOSE BLD STRIP.AUTO-MCNC: 189 MG/DL (ref 65–99)
GLUCOSE BLD STRIP.AUTO-MCNC: 196 MG/DL (ref 65–99)
GLUCOSE SERPL-MCNC: 135 MG/DL (ref 65–99)
HCT VFR BLD AUTO: 37 % (ref 37–47)
HGB BLD-MCNC: 12.5 G/DL (ref 12–16)
MCH RBC QN AUTO: 30.6 PG (ref 27–33)
MCHC RBC AUTO-ENTMCNC: 33.8 G/DL (ref 33.6–35)
MCV RBC AUTO: 90.7 FL (ref 81.4–97.8)
PLATELET # BLD AUTO: 206 K/UL (ref 164–446)
PMV BLD AUTO: 10.3 FL (ref 9–12.9)
POTASSIUM SERPL-SCNC: 3.5 MMOL/L (ref 3.6–5.5)
RBC # BLD AUTO: 4.08 M/UL (ref 4.2–5.4)
SODIUM SERPL-SCNC: 142 MMOL/L (ref 135–145)
WBC # BLD AUTO: 7.1 K/UL (ref 4.8–10.8)

## 2023-04-21 PROCEDURE — 80048 BASIC METABOLIC PNL TOTAL CA: CPT

## 2023-04-21 PROCEDURE — 700102 HCHG RX REV CODE 250 W/ 637 OVERRIDE(OP): Performed by: HOSPITALIST

## 2023-04-21 PROCEDURE — 99232 SBSQ HOSP IP/OBS MODERATE 35: CPT | Performed by: INTERNAL MEDICINE

## 2023-04-21 PROCEDURE — 700102 HCHG RX REV CODE 250 W/ 637 OVERRIDE(OP): Performed by: INTERNAL MEDICINE

## 2023-04-21 PROCEDURE — 770020 HCHG ROOM/CARE - TELE (206)

## 2023-04-21 PROCEDURE — 85027 COMPLETE CBC AUTOMATED: CPT

## 2023-04-21 PROCEDURE — A9270 NON-COVERED ITEM OR SERVICE: HCPCS | Performed by: INTERNAL MEDICINE

## 2023-04-21 PROCEDURE — A9270 NON-COVERED ITEM OR SERVICE: HCPCS | Performed by: HOSPITALIST

## 2023-04-21 PROCEDURE — 36415 COLL VENOUS BLD VENIPUNCTURE: CPT

## 2023-04-21 PROCEDURE — 82962 GLUCOSE BLOOD TEST: CPT

## 2023-04-21 RX ORDER — AMLODIPINE BESYLATE 5 MG/1
5 TABLET ORAL
Status: DISCONTINUED | OUTPATIENT
Start: 2023-04-21 | End: 2023-04-24 | Stop reason: HOSPADM

## 2023-04-21 RX ORDER — POTASSIUM CHLORIDE 20 MEQ/1
40 TABLET, EXTENDED RELEASE ORAL ONCE
Status: COMPLETED | OUTPATIENT
Start: 2023-04-21 | End: 2023-04-21

## 2023-04-21 RX ADMIN — ACETAMINOPHEN 650 MG: 325 TABLET, FILM COATED ORAL at 14:25

## 2023-04-21 RX ADMIN — LATANOPROST 1 DROP: 50 SOLUTION OPHTHALMIC at 21:39

## 2023-04-21 RX ADMIN — INSULIN GLARGINE-YFGN 25 UNITS: 100 INJECTION, SOLUTION SUBCUTANEOUS at 21:41

## 2023-04-21 RX ADMIN — AMLODIPINE BESYLATE 5 MG: 5 TABLET ORAL at 08:57

## 2023-04-21 RX ADMIN — POTASSIUM CHLORIDE 40 MEQ: 1500 TABLET, EXTENDED RELEASE ORAL at 08:57

## 2023-04-21 RX ADMIN — APIXABAN 10 MG: 5 TABLET, FILM COATED ORAL at 05:55

## 2023-04-21 RX ADMIN — APIXABAN 10 MG: 5 TABLET, FILM COATED ORAL at 17:25

## 2023-04-21 RX ADMIN — GABAPENTIN 200 MG: 100 CAPSULE ORAL at 05:54

## 2023-04-21 RX ADMIN — GABAPENTIN 200 MG: 100 CAPSULE ORAL at 17:25

## 2023-04-21 RX ADMIN — ACETAMINOPHEN 650 MG: 325 TABLET, FILM COATED ORAL at 21:36

## 2023-04-21 RX ADMIN — ATORVASTATIN CALCIUM 10 MG: 10 TABLET, FILM COATED ORAL at 21:37

## 2023-04-21 RX ADMIN — INSULIN HUMAN 1 UNITS: 100 INJECTION, SOLUTION PARENTERAL at 21:46

## 2023-04-21 RX ADMIN — GABAPENTIN 200 MG: 100 CAPSULE ORAL at 11:26

## 2023-04-21 RX ADMIN — INSULIN HUMAN 1 UNITS: 100 INJECTION, SOLUTION PARENTERAL at 17:25

## 2023-04-21 RX ADMIN — ACETAMINOPHEN 650 MG: 325 TABLET, FILM COATED ORAL at 06:07

## 2023-04-21 RX ADMIN — LEVOTHYROXINE SODIUM 100 MCG: 100 TABLET ORAL at 05:55

## 2023-04-21 ASSESSMENT — ENCOUNTER SYMPTOMS
BACK PAIN: 0
NAUSEA: 0
DIZZINESS: 0
SPUTUM PRODUCTION: 0
SHORTNESS OF BREATH: 0
COUGH: 0
STRIDOR: 0
VOMITING: 0
HEMOPTYSIS: 0
LOSS OF CONSCIOUSNESS: 0
PALPITATIONS: 0
DIARRHEA: 0
FEVER: 0
ABDOMINAL PAIN: 0
WHEEZING: 0
CHILLS: 0
SINUS PAIN: 0
HEADACHES: 0

## 2023-04-21 ASSESSMENT — PAIN DESCRIPTION - PAIN TYPE: TYPE: ACUTE PAIN

## 2023-04-21 ASSESSMENT — FIBROSIS 4 INDEX
FIB4 SCORE: 3.02
FIB4 SCORE: 3.02

## 2023-04-21 NOTE — PROGRESS NOTES
Hospital Medicine Daily Progress Note    Date of Service  4/21/2023    Chief Complaint  Rosetta Moreira is a 73 y.o. female admitted 4/17/2023 with syncope    Hospital Course  72yo PMHx DM, glaucoma, hypothyroid.  Presented with syncope.  Had been having pain in her R leg.  In the ED found to have B PE's with evidence of R heart strain.  Went for EKOS on    Interval Problem Update  Patient resting in bed  afebrile, denies any nausea or vomiting   Telemetry reviewed n=on sinus rhythm, 70- 80  Her BP not well controlled with sbp 150s. So starting patient on amlodipine   On RA    I have discussed this patient's plan of care and discharge plan at IDT rounds today with Case Management, Nursing, Nursing leadership, and other members of the IDT team.    Consultants/Specialty  pulmonary      Code Status  Full Code    Disposition  Patient is not medically cleared for discharge.   Anticipate discharge to to home with close outpatient follow-up.  I have placed the appropriate orders for post-discharge needs.    Review of Systems  Review of Systems   Constitutional:  Negative for chills and fever.   HENT:  Positive for nosebleeds. Negative for sinus pain.    Respiratory:  Negative for cough, hemoptysis, sputum production, shortness of breath, wheezing and stridor.    Cardiovascular:  Negative for chest pain, palpitations and leg swelling.   Gastrointestinal:  Negative for abdominal pain, diarrhea, nausea and vomiting.   Genitourinary:  Negative for dysuria and urgency.   Musculoskeletal:  Negative for back pain.   Neurological:  Negative for dizziness, loss of consciousness and headaches.   All other systems reviewed and are negative.     Physical Exam  Temp:  [36.3 °C (97.3 °F)-36.8 °C (98.2 °F)] 36.5 °C (97.7 °F)  Pulse:  [61-90] 71  Resp:  [14-18] 14  BP: (145-162)/(70-83) 150/74  SpO2:  [93 %-96 %] 96 %    Physical Exam  Vitals and nursing note reviewed.   Constitutional:       General: She is not in acute distress.      Appearance: Normal appearance. She is well-developed and underweight. She is not diaphoretic.   HENT:      Head: Normocephalic and atraumatic.   Eyes:      General: No scleral icterus.        Right eye: No discharge.         Left eye: No discharge.   Neck:      Vascular: No JVD.   Cardiovascular:      Rate and Rhythm: Normal rate and regular rhythm.      Heart sounds: No murmur heard.  Pulmonary:      Effort: Pulmonary effort is normal. No respiratory distress.      Breath sounds: No stridor. No wheezing or rales.   Abdominal:      General: There is no distension.      Palpations: Abdomen is soft.      Tenderness: There is no abdominal tenderness. There is no guarding or rebound.   Musculoskeletal:         General: No tenderness.      Right lower leg: No edema.      Left lower leg: No edema.   Skin:     General: Skin is warm and dry.      Capillary Refill: Capillary refill takes less than 2 seconds.      Coloration: Skin is not jaundiced or pale.      Findings: No rash.   Neurological:      General: No focal deficit present.      Mental Status: She is alert and oriented to person, place, and time. Mental status is at baseline.   Psychiatric:         Mood and Affect: Mood normal.         Behavior: Behavior normal.         Thought Content: Thought content normal.       Fluids    Intake/Output Summary (Last 24 hours) at 4/21/2023 1154  Last data filed at 4/21/2023 0800  Gross per 24 hour   Intake 890 ml   Output 0 ml   Net 890 ml         Laboratory  Recent Labs     04/21/23  0356   WBC 7.1   RBC 4.08*   HEMOGLOBIN 12.5   HEMATOCRIT 37.0   MCV 90.7   MCH 30.6   MCHC 33.8   RDW 46.0   PLATELETCT 206   MPV 10.3       Recent Labs     04/19/23  0425 04/21/23  0356   SODIUM 143 142   POTASSIUM 3.8 3.5*   CHLORIDE 112 110   CO2 22 21   GLUCOSE 104* 135*   BUN 22 16   CREATININE 0.87 0.73   CALCIUM 8.7 8.6                       Imaging  US-EXTREMITY VENOUS LOWER BILAT   Final Result      EC-ECHOCARDIOGRAM COMPLETE W/O CONT    Final Result      CT-CTA CHEST PULMONARY ARTERY W/ RECONS   Final Result      1.  Extensive bilateral saddle pulmonary emboli.      2.  Severe right heart strain.            CT-HEAD W/O   Final Result      1.  Hyperostosis frontalis interna and dural calcifications.   2.  Otherwise unremarkable head CT for the patient's age.         DX-CHEST-PORTABLE (1 VIEW)   Final Result      No acute cardiac or pulmonary abnormalities are identified.             Assessment/Plan  * Acute pulmonary embolism with acute cor pulmonale, unspecified pulmonary embolism type (HCC)- (present on admission)  Assessment & Plan  Patient found to have extensive bilateral saddle pulmonary emboli with severe right heart strain.   Elevated cardiac markers.  TTE showing evidence of R heart strain   Due to multiple recent GLFs felt too high risk for thrombolytics or EKOS  Likely a triggered event from R leg and lack of activity due to knee OA  Currently on enoxaparin 1mg/kg; change to apixaban tonight  Cont Tele  Mobilize with therapy    4/19  Now on apixaban and tolerating well  On RA  Pressures are now increasing implying improved cardiac function  Cont Tele  4/20 cont on eliquis sating well on RA     Syncope  Assessment & Plan  Secondary to obstructive etiology: PE  Cont Tele  EKG otherwise unremarkable  TTE also otherwise unremarkable    4/19  Tele remains unremarkable  Up and ambulating to the bathroom with no issues    Elevated liver enzymes  Assessment & Plan  Normal in  2021 which is our most recent comparison  Suspect secondary to obstructive shock from PE  Stable today      Slowly trending down     Advance care planning  Assessment & Plan  DW my partner on admission    Type 2 diabetes mellitus (HCC)  Assessment & Plan  glragine 25units  Increase to mod SSI   Carb consistent diet  As outpt is on   -lantus 30u  -glipized 10    Acute deep vein thrombosis (DVT) of popliteal vein of right lower extremity (HCC)  Assessment & Plan  Patient  underwent ultrasound DVT and she found to have acute to subacute, occlusive thrombus in the right popliteal vein, right gastroc vein, right posterior tibial vein and right peroneal vein.  Patient reported she has not been moving her right lower extremity due to recent injury and pain.  enoxabaprin 1mg/kg at present and transition to apixaban this evening  Cont on eliquis          VTE prophylaxis: therapeutic anticoagulation with enoxaparin    I have performed a physical exam and reviewed and updated ROS and Plan today (4/21/2023). In review of yesterday's note (4/20/2023), there are no changes except as documented above.

## 2023-04-21 NOTE — DISCHARGE PLANNING
DC Transport Scheduled    Received request at: 4/21/2023 at 1132    Transport Company Scheduled:  [employer]    Scheduled Date: 4/24/2023  Scheduled Time: 0930    Destination: [facility] at [address] [facility]     Notified care team of scheduled transport via Voalte.     If there are any changes needed to the DC transportation scheduled, please contact [facility] at ext. [phone] between the hours of [phone] Mon-Fri. If outside those hours, contact the ED Case Manager at ext. [phone].

## 2023-04-21 NOTE — DISCHARGE PLANNING
0917  Agency/Facility Name: [facility] ([facility])   Outcome: DPA left VM regarding bed availability, DPA awaiting call back.     1026  Agency/Facility Name: [facility]   Spoke To: Hannah   Outcome: Hannah called to notify DPA that Pt can be accepted on Monday 4/24 at 1100, to the [facility] on a swing bed. Hannah  also requesting updated clinicals.     7670  DPA sent updated clinical via Communication Management to [phone]  Agency/Facility Name: [facility]   Outcome: DPA left VM regarding transport for Monday at 0930 and updated clinicals were faxed.

## 2023-04-21 NOTE — DISCHARGE PLANNING
I have requested a transport quote from [facility] for this Monday trip to [facility]. I will update when I learn more.   SLC

## 2023-04-22 LAB
ANION GAP SERPL CALC-SCNC: 12 MMOL/L (ref 7–16)
BUN SERPL-MCNC: 16 MG/DL (ref 8–22)
CALCIUM SERPL-MCNC: 9.1 MG/DL (ref 8.5–10.5)
CHLORIDE SERPL-SCNC: 109 MMOL/L (ref 96–112)
CO2 SERPL-SCNC: 21 MMOL/L (ref 20–33)
CREAT SERPL-MCNC: 0.77 MG/DL (ref 0.5–1.4)
ERYTHROCYTE [DISTWIDTH] IN BLOOD BY AUTOMATED COUNT: 47.5 FL (ref 35.9–50)
GFR SERPLBLD CREATININE-BSD FMLA CKD-EPI: 81 ML/MIN/1.73 M 2
GLUCOSE BLD STRIP.AUTO-MCNC: 168 MG/DL (ref 65–99)
GLUCOSE BLD STRIP.AUTO-MCNC: 195 MG/DL (ref 65–99)
GLUCOSE BLD STRIP.AUTO-MCNC: 267 MG/DL (ref 65–99)
GLUCOSE BLD STRIP.AUTO-MCNC: 295 MG/DL (ref 65–99)
GLUCOSE SERPL-MCNC: 120 MG/DL (ref 65–99)
HCT VFR BLD AUTO: 40 % (ref 37–47)
HGB BLD-MCNC: 13 G/DL (ref 12–16)
MCH RBC QN AUTO: 30.1 PG (ref 27–33)
MCHC RBC AUTO-ENTMCNC: 32.5 G/DL (ref 33.6–35)
MCV RBC AUTO: 92.6 FL (ref 81.4–97.8)
PLATELET # BLD AUTO: 250 K/UL (ref 164–446)
PMV BLD AUTO: 10 FL (ref 9–12.9)
POTASSIUM SERPL-SCNC: 3.9 MMOL/L (ref 3.6–5.5)
RBC # BLD AUTO: 4.32 M/UL (ref 4.2–5.4)
SODIUM SERPL-SCNC: 142 MMOL/L (ref 135–145)
WBC # BLD AUTO: 7.2 K/UL (ref 4.8–10.8)

## 2023-04-22 PROCEDURE — 700102 HCHG RX REV CODE 250 W/ 637 OVERRIDE(OP): Performed by: HOSPITALIST

## 2023-04-22 PROCEDURE — 36415 COLL VENOUS BLD VENIPUNCTURE: CPT

## 2023-04-22 PROCEDURE — 700102 HCHG RX REV CODE 250 W/ 637 OVERRIDE(OP): Performed by: INTERNAL MEDICINE

## 2023-04-22 PROCEDURE — A9270 NON-COVERED ITEM OR SERVICE: HCPCS | Performed by: INTERNAL MEDICINE

## 2023-04-22 PROCEDURE — 80048 BASIC METABOLIC PNL TOTAL CA: CPT

## 2023-04-22 PROCEDURE — 82962 GLUCOSE BLOOD TEST: CPT | Mod: 91

## 2023-04-22 PROCEDURE — A9270 NON-COVERED ITEM OR SERVICE: HCPCS | Performed by: HOSPITALIST

## 2023-04-22 PROCEDURE — 85027 COMPLETE CBC AUTOMATED: CPT

## 2023-04-22 PROCEDURE — 700101 HCHG RX REV CODE 250: Performed by: STUDENT IN AN ORGANIZED HEALTH CARE EDUCATION/TRAINING PROGRAM

## 2023-04-22 PROCEDURE — 770020 HCHG ROOM/CARE - TELE (206)

## 2023-04-22 PROCEDURE — 99232 SBSQ HOSP IP/OBS MODERATE 35: CPT | Performed by: INTERNAL MEDICINE

## 2023-04-22 RX ADMIN — ACETAMINOPHEN 650 MG: 325 TABLET, FILM COATED ORAL at 12:59

## 2023-04-22 RX ADMIN — ACETAMINOPHEN 650 MG: 325 TABLET, FILM COATED ORAL at 05:52

## 2023-04-22 RX ADMIN — ACETAMINOPHEN 650 MG: 325 TABLET, FILM COATED ORAL at 19:46

## 2023-04-22 RX ADMIN — APIXABAN 10 MG: 5 TABLET, FILM COATED ORAL at 05:53

## 2023-04-22 RX ADMIN — INSULIN HUMAN 5 UNITS: 100 INJECTION, SOLUTION PARENTERAL at 20:02

## 2023-04-22 RX ADMIN — INSULIN GLARGINE-YFGN 25 UNITS: 100 INJECTION, SOLUTION SUBCUTANEOUS at 20:03

## 2023-04-22 RX ADMIN — ATORVASTATIN CALCIUM 10 MG: 10 TABLET, FILM COATED ORAL at 20:03

## 2023-04-22 RX ADMIN — GABAPENTIN 200 MG: 100 CAPSULE ORAL at 05:53

## 2023-04-22 RX ADMIN — INSULIN HUMAN 1 UNITS: 100 INJECTION, SOLUTION PARENTERAL at 12:56

## 2023-04-22 RX ADMIN — LATANOPROST 1 DROP: 50 SOLUTION OPHTHALMIC at 20:04

## 2023-04-22 RX ADMIN — INSULIN HUMAN 1 UNITS: 100 INJECTION, SOLUTION PARENTERAL at 08:19

## 2023-04-22 RX ADMIN — GABAPENTIN 200 MG: 100 CAPSULE ORAL at 17:19

## 2023-04-22 RX ADMIN — AMLODIPINE BESYLATE 5 MG: 5 TABLET ORAL at 05:54

## 2023-04-22 RX ADMIN — INSULIN HUMAN 5 UNITS: 100 INJECTION, SOLUTION PARENTERAL at 17:00

## 2023-04-22 RX ADMIN — APIXABAN 10 MG: 5 TABLET, FILM COATED ORAL at 17:17

## 2023-04-22 RX ADMIN — GABAPENTIN 200 MG: 100 CAPSULE ORAL at 12:56

## 2023-04-22 RX ADMIN — LIDOCAINE 1 PATCH: 50 PATCH TOPICAL at 19:48

## 2023-04-22 RX ADMIN — LEVOTHYROXINE SODIUM 100 MCG: 100 TABLET ORAL at 05:54

## 2023-04-22 ASSESSMENT — FIBROSIS 4 INDEX: FIB4 SCORE: 2.49

## 2023-04-22 ASSESSMENT — ENCOUNTER SYMPTOMS
BACK PAIN: 0
HEMOPTYSIS: 0
PALPITATIONS: 0
LOSS OF CONSCIOUSNESS: 0
NAUSEA: 0
SINUS PAIN: 0
STRIDOR: 0
WHEEZING: 0
VOMITING: 0
HEADACHES: 0
SPUTUM PRODUCTION: 0
ABDOMINAL PAIN: 0
DIZZINESS: 0
FEVER: 0
COUGH: 0
DIARRHEA: 0
CHILLS: 0
SHORTNESS OF BREATH: 0

## 2023-04-22 ASSESSMENT — PAIN DESCRIPTION - PAIN TYPE: TYPE: ACUTE PAIN;CHRONIC PAIN

## 2023-04-22 ASSESSMENT — PATIENT HEALTH QUESTIONNAIRE - PHQ9
1. LITTLE INTEREST OR PLEASURE IN DOING THINGS: NOT AT ALL
2. FEELING DOWN, DEPRESSED, IRRITABLE, OR HOPELESS: NOT AT ALL
SUM OF ALL RESPONSES TO PHQ9 QUESTIONS 1 AND 2: 0

## 2023-04-22 NOTE — PROGRESS NOTES
Hospital Medicine Daily Progress Note    Date of Service  4/22/2023    Chief Complaint  Rosetta Moreira is a 73 y.o. female admitted 4/17/2023 with syncope    Hospital Course  74yo PMHx DM, glaucoma, hypothyroid.  Presented with syncope.  Had been having pain in her R leg.  In the ED found to have B PE's with evidence of R heart strain.  Went for EKOS on    Interval Problem Update  Patient seen and examined afebrile resting in bed, no acute events overnight   Telemetry monitor shows sinus rhythm  Her BP better controlled now SBP in the 130s   CM helping on placement arrangements     I have discussed this patient's plan of care and discharge plan at IDT rounds today with Case Management, Nursing, Nursing leadership, and other members of the IDT team.    Consultants/Specialty  pulmonary      Code Status  Full Code    Disposition  Patient is not medically cleared for discharge.   Anticipate discharge to to home with close outpatient follow-up.  I have placed the appropriate orders for post-discharge needs.    Review of Systems  Review of Systems   Constitutional:  Negative for chills and fever.   HENT:  Positive for nosebleeds. Negative for sinus pain.    Respiratory:  Negative for cough, hemoptysis, sputum production, shortness of breath, wheezing and stridor.    Cardiovascular:  Negative for chest pain, palpitations and leg swelling.   Gastrointestinal:  Negative for abdominal pain, diarrhea, nausea and vomiting.   Genitourinary:  Negative for dysuria and urgency.   Musculoskeletal:  Negative for back pain.   Neurological:  Negative for dizziness, loss of consciousness and headaches.   All other systems reviewed and are negative.     Physical Exam  Temp:  [36.5 °C (97.7 °F)-36.6 °C (97.9 °F)] 36.5 °C (97.7 °F)  Pulse:  [67-95] 95  Resp:  [14-16] 16  BP: (122-163)/(66-93) 131/78  SpO2:  [91 %-96 %] 94 %    Physical Exam  Vitals and nursing note reviewed.   Constitutional:       General: She is not in acute  distress.     Appearance: Normal appearance. She is well-developed and underweight. She is not diaphoretic.   HENT:      Head: Normocephalic and atraumatic.   Eyes:      General: No scleral icterus.        Right eye: No discharge.         Left eye: No discharge.   Neck:      Vascular: No JVD.   Cardiovascular:      Rate and Rhythm: Normal rate and regular rhythm.      Heart sounds: No murmur heard.  Pulmonary:      Effort: Pulmonary effort is normal. No respiratory distress.      Breath sounds: No stridor. No wheezing or rales.   Abdominal:      General: There is no distension.      Palpations: Abdomen is soft.      Tenderness: There is no abdominal tenderness. There is no guarding or rebound.   Musculoskeletal:         General: No tenderness.      Right lower leg: No edema.      Left lower leg: No edema.   Skin:     General: Skin is warm and dry.      Capillary Refill: Capillary refill takes less than 2 seconds.      Coloration: Skin is not jaundiced or pale.      Findings: No rash.   Neurological:      General: No focal deficit present.      Mental Status: She is alert and oriented to person, place, and time. Mental status is at baseline.   Psychiatric:         Mood and Affect: Mood normal.         Behavior: Behavior normal.         Thought Content: Thought content normal.       Fluids    Intake/Output Summary (Last 24 hours) at 4/22/2023 1205  Last data filed at 4/21/2023 2031  Gross per 24 hour   Intake 120 ml   Output --   Net 120 ml       Laboratory  Recent Labs     04/21/23  0356 04/22/23  0143   WBC 7.1 7.2   RBC 4.08* 4.32   HEMOGLOBIN 12.5 13.0   HEMATOCRIT 37.0 40.0   MCV 90.7 92.6   MCH 30.6 30.1   MCHC 33.8 32.5*   RDW 46.0 47.5   PLATELETCT 206 250   MPV 10.3 10.0     Recent Labs     04/21/23  0356 04/22/23  0143   SODIUM 142 142   POTASSIUM 3.5* 3.9   CHLORIDE 110 109   CO2 21 21   GLUCOSE 135* 120*   BUN 16 16   CREATININE 0.73 0.77   CALCIUM 8.6 9.1                     Imaging  US-EXTREMITY VENOUS  LOWER BILAT   Final Result      EC-ECHOCARDIOGRAM COMPLETE W/O CONT   Final Result      CT-CTA CHEST PULMONARY ARTERY W/ RECONS   Final Result      1.  Extensive bilateral saddle pulmonary emboli.      2.  Severe right heart strain.            CT-HEAD W/O   Final Result      1.  Hyperostosis frontalis interna and dural calcifications.   2.  Otherwise unremarkable head CT for the patient's age.         DX-CHEST-PORTABLE (1 VIEW)   Final Result      No acute cardiac or pulmonary abnormalities are identified.             Assessment/Plan  * Acute pulmonary embolism with acute cor pulmonale, unspecified pulmonary embolism type (HCC)- (present on admission)  Assessment & Plan  Patient found to have extensive bilateral saddle pulmonary emboli with severe right heart strain.   Elevated cardiac markers.  TTE showing evidence of R heart strain   Due to multiple recent GLFs felt too high risk for thrombolytics or EKOS  Likely a triggered event from R leg and lack of activity due to knee OA  Currently on enoxaparin 1mg/kg; change to apixaban tonight  Cont Tele  Mobilize with therapy    4/19  Now on apixaban and tolerating well  On RA  Pressures are now increasing implying improved cardiac function  Cont Tele  4/20 cont on eliquis sating well on RA     Syncope  Assessment & Plan  Secondary to obstructive etiology: PE  Cont Tele  EKG otherwise unremarkable  TTE also otherwise unremarkable    4/19  Tele remains unremarkable  Up and ambulating to the bathroom with no issues    Elevated liver enzymes  Assessment & Plan  Normal in  2021 which is our most recent comparison  Suspect secondary to obstructive shock from PE  Stable today      Slowly trending down     Advance care planning  Assessment & Plan  DW my partner on admission    Type 2 diabetes mellitus (HCC)  Assessment & Plan  glragine 25units  Increase to mod SSI   Carb consistent diet  As outpt is on   -lantus 30u  -glipized 10    Acute deep vein thrombosis (DVT) of popliteal  vein of right lower extremity (HCC)  Assessment & Plan  Patient underwent ultrasound DVT and she found to have acute to subacute, occlusive thrombus in the right popliteal vein, right gastroc vein, right posterior tibial vein and right peroneal vein.  Patient reported she has not been moving her right lower extremity due to recent injury and pain.  enoxabaprin 1mg/kg at present and transition to apixaban this evening  Cont on eliquis          VTE prophylaxis: therapeutic anticoagulation with enoxaparin    I have performed a physical exam and reviewed and updated ROS and Plan today (4/22/2023). In review of yesterday's note (4/21/2023), there are no changes except as documented above.

## 2023-04-22 NOTE — PROGRESS NOTES
Report received from Liza Purcell. Assumed pt care. Pt resting in bed. Pt A&O x 4. Fall precautions in place, call light and belongings within reach, bed in lowest position. No signs of distress.

## 2023-04-22 NOTE — CARE PLAN
The patient is Stable - Low risk of patient condition declining or worsening    Shift Goals  Clinical Goals: Monitor vitals and pain managment  Patient Goals: sleep  Family Goals: eulogio    Progress made toward(s) clinical / shift goals:    Problem: Skin Integrity  Goal: Skin integrity is maintained or improved  Outcome: Progressing     Problem: Fall Risk  Goal: Patient will remain free from falls  Outcome: Progressing     Problem: Pain - Standard  Goal: Alleviation of pain or a reduction in pain to the patient’s comfort goal  Outcome: Progressing     Problem: Mobility  Goal: Patient's capacity to carry out activities will improve  Outcome: Progressing

## 2023-04-23 LAB
GLUCOSE BLD STRIP.AUTO-MCNC: 191 MG/DL (ref 65–99)
GLUCOSE BLD STRIP.AUTO-MCNC: 205 MG/DL (ref 65–99)
GLUCOSE BLD STRIP.AUTO-MCNC: 223 MG/DL (ref 65–99)
GLUCOSE BLD STRIP.AUTO-MCNC: 315 MG/DL (ref 65–99)

## 2023-04-23 PROCEDURE — 82962 GLUCOSE BLOOD TEST: CPT | Mod: 91

## 2023-04-23 PROCEDURE — A9270 NON-COVERED ITEM OR SERVICE: HCPCS | Performed by: INTERNAL MEDICINE

## 2023-04-23 PROCEDURE — 770020 HCHG ROOM/CARE - TELE (206)

## 2023-04-23 PROCEDURE — A9270 NON-COVERED ITEM OR SERVICE: HCPCS | Performed by: HOSPITALIST

## 2023-04-23 PROCEDURE — 700101 HCHG RX REV CODE 250: Performed by: STUDENT IN AN ORGANIZED HEALTH CARE EDUCATION/TRAINING PROGRAM

## 2023-04-23 PROCEDURE — 700102 HCHG RX REV CODE 250 W/ 637 OVERRIDE(OP): Performed by: INTERNAL MEDICINE

## 2023-04-23 PROCEDURE — 700102 HCHG RX REV CODE 250 W/ 637 OVERRIDE(OP): Performed by: HOSPITALIST

## 2023-04-23 PROCEDURE — 99232 SBSQ HOSP IP/OBS MODERATE 35: CPT | Performed by: INTERNAL MEDICINE

## 2023-04-23 RX ORDER — OMEPRAZOLE 20 MG/1
20 CAPSULE, DELAYED RELEASE ORAL DAILY
Status: DISCONTINUED | OUTPATIENT
Start: 2023-04-23 | End: 2023-04-24 | Stop reason: HOSPADM

## 2023-04-23 RX ADMIN — ACETAMINOPHEN 650 MG: 325 TABLET, FILM COATED ORAL at 20:49

## 2023-04-23 RX ADMIN — OMEPRAZOLE 20 MG: 20 CAPSULE, DELAYED RELEASE ORAL at 17:33

## 2023-04-23 RX ADMIN — INSULIN HUMAN 3 UNITS: 100 INJECTION, SOLUTION PARENTERAL at 17:32

## 2023-04-23 RX ADMIN — LATANOPROST 1 DROP: 50 SOLUTION OPHTHALMIC at 20:35

## 2023-04-23 RX ADMIN — INSULIN HUMAN 1 UNITS: 100 INJECTION, SOLUTION PARENTERAL at 08:03

## 2023-04-23 RX ADMIN — ATORVASTATIN CALCIUM 10 MG: 10 TABLET, FILM COATED ORAL at 20:35

## 2023-04-23 RX ADMIN — AMLODIPINE BESYLATE 5 MG: 5 TABLET ORAL at 05:14

## 2023-04-23 RX ADMIN — APIXABAN 10 MG: 5 TABLET, FILM COATED ORAL at 05:14

## 2023-04-23 RX ADMIN — LIDOCAINE 1 PATCH: 50 PATCH TOPICAL at 20:34

## 2023-04-23 RX ADMIN — INSULIN HUMAN 6 UNITS: 100 INJECTION, SOLUTION PARENTERAL at 20:30

## 2023-04-23 RX ADMIN — INSULIN GLARGINE-YFGN 25 UNITS: 100 INJECTION, SOLUTION SUBCUTANEOUS at 20:33

## 2023-04-23 RX ADMIN — GABAPENTIN 200 MG: 100 CAPSULE ORAL at 12:18

## 2023-04-23 RX ADMIN — LEVOTHYROXINE SODIUM 100 MCG: 100 TABLET ORAL at 05:14

## 2023-04-23 RX ADMIN — ACETAMINOPHEN 650 MG: 325 TABLET, FILM COATED ORAL at 12:19

## 2023-04-23 RX ADMIN — APIXABAN 10 MG: 5 TABLET, FILM COATED ORAL at 17:33

## 2023-04-23 RX ADMIN — GABAPENTIN 200 MG: 100 CAPSULE ORAL at 17:33

## 2023-04-23 RX ADMIN — INSULIN HUMAN 3 UNITS: 100 INJECTION, SOLUTION PARENTERAL at 12:19

## 2023-04-23 RX ADMIN — GABAPENTIN 200 MG: 100 CAPSULE ORAL at 05:15

## 2023-04-23 RX ADMIN — ACETAMINOPHEN 650 MG: 325 TABLET, FILM COATED ORAL at 05:15

## 2023-04-23 ASSESSMENT — ENCOUNTER SYMPTOMS
SINUS PAIN: 0
DIZZINESS: 0
WHEEZING: 0
SPUTUM PRODUCTION: 0
VOMITING: 0
BACK PAIN: 0
FEVER: 0
SHORTNESS OF BREATH: 0
COUGH: 0
ABDOMINAL PAIN: 0
PALPITATIONS: 0
HEMOPTYSIS: 0
HEADACHES: 0
LOSS OF CONSCIOUSNESS: 0
CHILLS: 0
DIARRHEA: 0
NAUSEA: 0
STRIDOR: 0

## 2023-04-23 ASSESSMENT — PATIENT HEALTH QUESTIONNAIRE - PHQ9
2. FEELING DOWN, DEPRESSED, IRRITABLE, OR HOPELESS: NOT AT ALL
SUM OF ALL RESPONSES TO PHQ9 QUESTIONS 1 AND 2: 0
1. LITTLE INTEREST OR PLEASURE IN DOING THINGS: NOT AT ALL

## 2023-04-23 ASSESSMENT — PAIN DESCRIPTION - PAIN TYPE
TYPE: ACUTE PAIN;CHRONIC PAIN
TYPE: ACUTE PAIN

## 2023-04-23 NOTE — DISCHARGE PLANNING
Case Management Discharge Planning    Admission Date: 4/17/2023  GMLOS: 3.9  ALOS: 6      Anticipated Discharge Dispo: Discharge Disposition: D/T to SNF with Medicare cert in anticipation of skilled care (03)      Barriers to Discharge: Medical clearance and Transportation    DC Transport Scheduled     Received request at: 4/21/2023 at 1132     Transport Company Scheduled:  [employer]     Scheduled Date: 4/24/2023  Scheduled Time: 0930     Destination: [facility] at [address] [facility]      Notified care team of scheduled transport via Voalte.      If there are any changes needed to the DC transportation scheduled, please contact [facility] at ext. [phone] between the hours of [phone] Mon-Fri. If outside those hours, contact the ED Case Manager at ext. [phone].

## 2023-04-23 NOTE — PROGRESS NOTES
[facility] Medicine Daily Progress Note    Date of Service  4/23/2023    Chief Complaint  Rosetta Moreira is a 73 y.o. female admitted 4/17/2023 with syncope    Hospital Course  72yo PMHx DM, glaucoma, hypothyroid.  Presented with syncope.  Had been having pain in her R leg.  In the ED found to have B PE's with evidence of R heart strain.  Went for EKOS on    Interval Problem Update  No acute events overnight, afebrile denies any chest pain now on eliquis for her Pulmonary embolism   Her BP better controlled now SBP in the 1110-125  Needs placement   CM helping on placement arrangements     I have discussed this patient's plan of care and discharge plan at IDT rounds today with Case Management, Nursing, Nursing leadership, and other members of the IDT team.    Consultants/Specialty  pulmonary      Code Status  Full Code    Disposition  Patient is not medically cleared for discharge.   Anticipate discharge to to home with close outpatient follow-up.  I have placed the appropriate orders for post-discharge needs.    Review of Systems  Review of Systems   Constitutional:  Negative for chills and fever.   HENT:  Positive for nosebleeds. Negative for sinus pain.    Respiratory:  Negative for cough, hemoptysis, sputum production, shortness of breath, wheezing and stridor.    Cardiovascular:  Negative for chest pain, palpitations and leg swelling.   Gastrointestinal:  Negative for abdominal pain, diarrhea, nausea and vomiting.   Genitourinary:  Negative for dysuria and urgency.   Musculoskeletal:  Negative for back pain.   Neurological:  Negative for dizziness, loss of consciousness and headaches.   All other systems reviewed and are negative.     Physical Exam  Temp:  [36.2 °C (97.2 °F)-36.6 °C (97.9 °F)] 36.3 °C (97.3 °F)  Pulse:  [66-91] 73  Resp:  [16-20] 16  BP: (105-158)/(60-82) 105/60  SpO2:  [93 %-96 %] 94 %    Physical Exam  Vitals and nursing note reviewed.   Constitutional:       General: She is not in acute  distress.     Appearance: Normal appearance. She is well-developed and underweight. She is not diaphoretic.   HENT:      Head: Normocephalic and atraumatic.   Eyes:      General: No scleral icterus.  Neck:      Vascular: No JVD.   Cardiovascular:      Rate and Rhythm: Normal rate and regular rhythm.      Heart sounds: No murmur heard.  Pulmonary:      Effort: Pulmonary effort is normal. No respiratory distress.      Breath sounds: No stridor. No wheezing or rales.   Abdominal:      Palpations: Abdomen is soft.      Tenderness: There is no abdominal tenderness. There is no guarding or rebound.   Musculoskeletal:         General: No tenderness.      Right lower leg: No edema.      Left lower leg: No edema.   Skin:     General: Skin is warm and dry.      Capillary Refill: Capillary refill takes less than 2 seconds.      Coloration: Skin is not jaundiced or pale.      Findings: No rash.   Neurological:      General: No focal deficit present.      Mental Status: She is alert and oriented to person, place, and time. Mental status is at baseline.   Psychiatric:         Mood and Affect: Mood normal.         Behavior: Behavior normal.         Thought Content: Thought content normal.       Fluids    Intake/Output Summary (Last 24 hours) at 4/23/2023 1247  Last data filed at 4/23/2023 0547  Gross per 24 hour   Intake 690 ml   Output --   Net 690 ml       Laboratory  Recent Labs     04/21/23  0356 04/22/23  0143   WBC 7.1 7.2   RBC 4.08* 4.32   HEMOGLOBIN 12.5 13.0   HEMATOCRIT 37.0 40.0   MCV 90.7 92.6   MCH 30.6 30.1   MCHC 33.8 32.5*   RDW 46.0 47.5   PLATELETCT 206 250   MPV 10.3 10.0     Recent Labs     04/21/23  0356 04/22/23  0143   SODIUM 142 142   POTASSIUM 3.5* 3.9   CHLORIDE 110 109   CO2 21 21   GLUCOSE 135* 120*   BUN 16 16   CREATININE 0.73 0.77   CALCIUM 8.6 9.1                     Imaging  US-EXTREMITY VENOUS LOWER BILAT   Final Result      EC-ECHOCARDIOGRAM COMPLETE W/O CONT   Final Result      CT-CTA CHEST  PULMONARY ARTERY W/ RECONS   Final Result      1.  Extensive bilateral saddle pulmonary emboli.      2.  Severe right heart strain.            CT-HEAD W/O   Final Result      1.  Hyperostosis frontalis interna and dural calcifications.   2.  Otherwise unremarkable head CT for the patient's age.         DX-CHEST-PORTABLE (1 VIEW)   Final Result      No acute cardiac or pulmonary abnormalities are identified.             Assessment/Plan  * Acute pulmonary embolism with acute cor pulmonale, unspecified pulmonary embolism type (HCC)- (present on admission)  Assessment & Plan  Patient found to have extensive bilateral saddle pulmonary emboli with severe right heart strain.   Elevated cardiac markers.  TTE showing evidence of R heart strain   Due to multiple recent GLFs felt too high risk for thrombolytics or EKOS  Likely a triggered event from R leg and lack of activity due to knee OA  Currently on enoxaparin 1mg/kg; change to apixaban tonight  Cont Tele  Mobilize with therapy    4/19  Now on apixaban and tolerating well  On RA  Pressures are now increasing implying improved cardiac function  Cont Tele  4/20 cont on eliquis sating well on RA     Syncope  Assessment & Plan  Secondary to obstructive etiology: PE  Cont Tele  EKG otherwise unremarkable  TTE also otherwise unremarkable    4/19  Tele remains unremarkable  Up and ambulating to the bathroom with no issues    Elevated liver enzymes  Assessment & Plan  Normal in  2021 which is our most recent comparison  Suspect secondary to obstructive shock from PE  Stable today      Slowly trending down     Advance care planning  Assessment & Plan  DW my partner on admission    Type 2 diabetes mellitus (HCC)  Assessment & Plan  glragine 25units  Increase to mod SSI   Carb consistent diet  As outpt is on   -lantus 30u  -glipized 10    Acute deep vein thrombosis (DVT) of popliteal vein of right lower extremity (HCC)  Assessment & Plan  Patient underwent ultrasound DVT and she  found to have acute to subacute, occlusive thrombus in the right popliteal vein, right gastroc vein, right posterior tibial vein and right peroneal vein.  Patient reported she has not been moving her right lower extremity due to recent injury and pain.  enoxabaprin 1mg/kg at present and transition to apixaban this evening  Cont on eliquis          VTE prophylaxis: therapeutic anticoagulation with enoxaparin    I have performed a physical exam and reviewed and updated ROS and Plan today (4/23/2023). In review of yesterday's note (4/22/2023), there are no changes except as documented above.

## 2023-04-23 NOTE — CARE PLAN
The patient is Stable - Low risk of patient condition declining or worsening    Shift Goals  Clinical Goals: VSS, pain management, pulm toilet  Patient Goals: rest well  Family Goals: eulogio    Progress made toward(s) clinical / shift goals:    Problem: Knowledge Deficit - Standard  Goal: Patient and family/care givers will demonstrate understanding of plan of care, disease process/condition, diagnostic tests and medications  Outcome: Progressing     Problem: Skin Integrity  Goal: Skin integrity is maintained or improved  Outcome: Progressing     Problem: Fall Risk  Goal: Patient will remain free from falls  Outcome: Progressing     Problem: Pain - Standard  Goal: Alleviation of pain or a reduction in pain to the patient’s comfort goal  Outcome: Progressing     Problem: Mobility  Goal: Patient's capacity to carry out activities will improve  Outcome: Progressing     Problem: Self Care  Goal: Patient will have the ability to perform ADLs independently or with assistance (bathe, groom, dress, toilet and feed)  Outcome: Progressing

## 2023-04-23 NOTE — CARE PLAN
The patient is Stable - Low risk of patient condition declining or worsening    Shift Goals  Clinical Goals: Monitor vitals and pain managment  Patient Goals: sleep  Family Goals: eulogio    Progress made toward(s) clinical / shift goals:    Problem: Knowledge Deficit - Standard  Goal: Patient and family/care givers will demonstrate understanding of plan of care, disease process/condition, diagnostic tests and medications  Outcome: Progressing     Problem: Skin Integrity  Goal: Skin integrity is maintained or improved  Outcome: Progressing     Problem: Fall Risk  Goal: Patient will remain free from falls  Outcome: Progressing

## 2023-04-24 VITALS
TEMPERATURE: 97.9 F | BODY MASS INDEX: 27.99 KG/M2 | RESPIRATION RATE: 18 BRPM | WEIGHT: 199.96 LBS | OXYGEN SATURATION: 95 % | HEART RATE: 80 BPM | DIASTOLIC BLOOD PRESSURE: 69 MMHG | SYSTOLIC BLOOD PRESSURE: 124 MMHG | HEIGHT: 71 IN

## 2023-04-24 LAB
GLUCOSE BLD STRIP.AUTO-MCNC: 171 MG/DL (ref 65–99)
GLUCOSE BLD STRIP.AUTO-MCNC: 224 MG/DL (ref 65–99)

## 2023-04-24 PROCEDURE — A9270 NON-COVERED ITEM OR SERVICE: HCPCS | Performed by: INTERNAL MEDICINE

## 2023-04-24 PROCEDURE — 82962 GLUCOSE BLOOD TEST: CPT

## 2023-04-24 PROCEDURE — A9270 NON-COVERED ITEM OR SERVICE: HCPCS | Performed by: HOSPITALIST

## 2023-04-24 PROCEDURE — 99239 HOSP IP/OBS DSCHRG MGMT >30: CPT | Performed by: INTERNAL MEDICINE

## 2023-04-24 PROCEDURE — 700102 HCHG RX REV CODE 250 W/ 637 OVERRIDE(OP): Performed by: INTERNAL MEDICINE

## 2023-04-24 PROCEDURE — 700102 HCHG RX REV CODE 250 W/ 637 OVERRIDE(OP): Performed by: HOSPITALIST

## 2023-04-24 RX ORDER — AMLODIPINE BESYLATE 5 MG/1
5 TABLET ORAL DAILY
Qty: 30 TABLET | Status: SHIPPED
Start: 2023-04-25

## 2023-04-24 RX ORDER — OMEPRAZOLE 20 MG/1
20 CAPSULE, DELAYED RELEASE ORAL DAILY
Qty: 30 CAPSULE | Status: SHIPPED
Start: 2023-04-25

## 2023-04-24 RX ADMIN — GABAPENTIN 200 MG: 100 CAPSULE ORAL at 04:42

## 2023-04-24 RX ADMIN — OMEPRAZOLE 20 MG: 20 CAPSULE, DELAYED RELEASE ORAL at 04:42

## 2023-04-24 RX ADMIN — AMLODIPINE BESYLATE 5 MG: 5 TABLET ORAL at 04:42

## 2023-04-24 RX ADMIN — INSULIN HUMAN 3 UNITS: 100 INJECTION, SOLUTION PARENTERAL at 07:59

## 2023-04-24 RX ADMIN — ACETAMINOPHEN 650 MG: 325 TABLET, FILM COATED ORAL at 04:41

## 2023-04-24 RX ADMIN — LEVOTHYROXINE SODIUM 100 MCG: 100 TABLET ORAL at 04:42

## 2023-04-24 RX ADMIN — APIXABAN 10 MG: 5 TABLET, FILM COATED ORAL at 04:41

## 2023-04-24 ASSESSMENT — FIBROSIS 4 INDEX: FIB4 SCORE: 2.49

## 2023-04-24 NOTE — DISCHARGE SUMMARY
Discharge Summary    CHIEF COMPLAINT ON ADMISSION  Chief Complaint   Patient presents with    Syncope     Family reports the found pt to have syncopized on toilet and was unresponsive for about 5 minutes prior to EMS arrival. Pt had additional syncopal episode with EMS when transferring pt.    Nausea       Reason for Admission  ems    Admission Date  4/17/2023     CODE STATUS  Full Code    HPI & HOSPITAL COURSE  This is a 73 y.o. female with PMHx DM, glaucoma, hypothyroid.  Presented with syncope.  Had been having pain in her R leg.  In the ED found to have B PE's with evidence of R heart strain. Had EKOS done and was initially admitted to ICU , then her symptoms improved and she was transferred to the floor . She was switched to oral eliquis 10 mg BID for 7 days and then 5m g BID> Patient now doing better no more syncopal episode sating well on room air.She was seen by physical therapy and placement has been recommended and she will be discharged to SNF today.     The patient met 2-midnight criteria for an inpatient stay at the time of discharge.      FOLLOW UP ITEMS POST DISCHARGE  PCP     DISCHARGE DIAGNOSES  Principal Problem:    Acute pulmonary embolism with acute cor pulmonale, unspecified pulmonary embolism type (HCC) POA: Yes  Active Problems:    Acute deep vein thrombosis (DVT) of popliteal vein of right lower extremity (HCC) POA: Unknown    Type 2 diabetes mellitus (HCC) POA: Unknown    Advance care planning POA: Unknown    Elevated liver enzymes POA: Unknown    Syncope POA: Unknown  Resolved Problems:    * No resolved hospital problems. *      FOLLOW UP  No future appointments.  Law Cuevas M.D.  [id number] [place] [id number] [phone]    Follow up  For hospital follow-up      MEDICATIONS ON DISCHARGE     Medication List        START taking these medications        Instructions   amLODIPine 5 MG Tabs  Start taking on: April 25, 2023  Commonly known as: NORVASC   Take 1 Tablet by mouth  every day.  Dose: 5 mg     * apixaban 5mg Tabs  Commonly known as: ELIQUIS   Take 2 Tablets by mouth 2 times a day for 1 day. Indications: DVT/PE  Dose: 10 mg     * apixaban 5mg Tabs  Start taking on: April 25, 2023  Commonly known as: ELIQUIS   Take 1 Tablet by mouth 2 times a day. Indications: DVT/PE  Dose: 5 mg     omeprazole 20 MG delayed-release capsule  Start taking on: April 25, 2023  Commonly known as: PRILOSEC   Take 1 Capsule by mouth every day.  Dose: 20 mg           * This list has 2 medication(s) that are the same as other medications prescribed for you. Read the directions carefully, and ask your doctor or other care provider to review them with you.                CONTINUE taking these medications        Instructions   acetaminophen-codeine #3 300-30 MG Tabs  Commonly known as: TYLENOL #3   Take 0.25-0.5 Tablets by mouth every evening as needed (SLEEP).  Dose: 0.25-0.5 Tablet     atorvastatin 10 MG Tabs  Commonly known as: LIPITOR   Take 10 mg by mouth every evening.  Dose: 10 mg     CINNAMON PO   Take 1 Tablet by mouth 2 times a day.  Dose: 1 Tablet     docusate sodium 100 MG Caps  Commonly known as: COLACE   Take 100 mg by mouth every evening.  Dose: 100 mg     gabapentin 100 MG Caps  Commonly known as: NEURONTIN   Take 200 mg by mouth 3 times a day.  Dose: 200 mg     glipiZIDE 10 MG Tabs  Commonly known as: GLUCOTROL   Take 10 mg by mouth 2 times a day.  Dose: 10 mg     insulin glargine 100 UNIT/ML Soln  Commonly known as: Lantus   Inject 30 Units as instructed every evening.  Dose: 30 Units     latanoprost 0.005 % Soln  Commonly known as: XALATAN   Place 1 Drop in both eyes every evening.  Dose: 1 Drop     levothyroxine 100 MCG Tabs  Commonly known as: SYNTHROID   Take 100 mcg by mouth Every morning on an empty stomach.  Dose: 100 mcg     vitamin B-12 1000 MCG Tabs   Take 1,000 mcg by mouth every day.  Dose: 1,000 mcg     Vitamin B6 100 MG Tabs   Take 100 mg by mouth every day.  Dose: 100 mg    "         STOP taking these medications      aspirin 325 MG Tabs  Commonly known as: ASA     aspirin EC 81 MG Tbec  Commonly known as: ECOTRIN     ibuprofen 200 MG Tabs  Commonly known as: MOTRIN              Allergies  Allergies   Allergen Reactions    Clindamycin Rash and Swelling     Whole body    Sulfa Drugs     Keflex Rash     \"Body rash\"    Lovaza [Omega-3-Acid Ethyl Esters] Diarrhea     acute    Naproxen Rash     \"Body rash\"    Pcn [Penicillins] Rash     \"Body rash\"    Percocet [Oxycodone-Acetaminophen] Itching     Full body     Robaxin [Kdc:Yellow Dye+Methocarbamol] Rash     rash    Travatan [Travoprost] Itching and Swelling    Robaxin [Methocarbamol]     Hydrocodone Itching       DIET  Orders Placed This Encounter   Procedures    Diet Order Diet: Consistent CHO (Diabetic)     Standing Status:   Standing     Number of Occurrences:   1     Order Specific Question:   Diet:     Answer:   Consistent CHO (Diabetic) [4]       ACTIVITY  As tolerated.  Weight bearing as tolerated    LINES, DRAINS, AND WOUNDS  This is an automated list. Peripheral IVs will be removed prior to discharge.  Peripheral IV 20 G Anterior;Left Forearm (Active)   Site Assessment Clean;Dry;Intact 04/23/23 2000   Dressing Type Occlusive;Transparent 04/23/23 2000   Line Status Flushed;Scrubbed the hub prior to access;Saline locked 04/23/23 2000   Dressing Status Clean;Dry;Intact 04/23/23 2000   Dressing Intervention N/A 04/23/23 2000   Infiltration Grading ([facility], [facility]) 0 04/22/23 1930   Phlebitis Scale ([facility] Only) 0 04/22/23 1930       Wound 05/20/22 Incision Foot Left xeroform, 4x4, susu, coban, post op boot (Active)       Peripheral IV 20 G Anterior;Left Forearm (Active)   Site Assessment Clean;Dry;Intact 04/23/23 2000   Dressing Type Occlusive;Transparent 04/23/23 2000   Line Status Flushed;Scrubbed the hub prior to access;Saline locked 04/23/23 2000   Dressing Status Clean;Dry;Intact 04/23/23 2000   Dressing Intervention N/A 04/23/23 " 2000   Infiltration Grading ([facility], [facility]) 0 04/22/23 1930   Phlebitis Scale ([facility]) 0 04/22/23 1930               MENTAL STATUS ON TRANSFER             CONSULTATIONS  Critical care     PROCEDURES  EKOS     LABORATORY  Lab Results   Component Value Date    SODIUM 142 04/22/2023    POTASSIUM 3.9 04/22/2023    CHLORIDE 109 04/22/2023    CO2 21 04/22/2023    GLUCOSE 120 (H) 04/22/2023    BUN 16 04/22/2023    CREATININE 0.77 04/22/2023        Lab Results   Component Value Date    WBC 7.2 04/22/2023    HEMOGLOBIN 13.0 04/22/2023    HEMATOCRIT 40.0 04/22/2023    PLATELETCT 250 04/22/2023        Total time of the discharge process exceeds 42 minutes.

## 2023-04-24 NOTE — DISCHARGE PLANNING
Agency/Facility Name: [facility]   Outcome: DPA called to notify that Pt has been picked up at [facility].

## 2023-04-24 NOTE — PROGRESS NOTES
Report given to Lynda ROSSI at [facility]. Patient picked up by GMT with all possessions.

## 2023-04-24 NOTE — THERAPY
Physical Therapy Contact Note    Patient Name: Rosetta Moreira  Age:  73 y.o., Sex:  female  Medical Record #: [id number]  Today's Date: 4/24/2023 04/24/23 0935   Interdisciplinary Plan of Care Collaboration   Collaboration Comments EMR indicates patient DCing today to [facility]. Will hold PT today and resume tomorrow if DC unsuccessful.

## 2024-06-04 ENCOUNTER — HOSPITAL ENCOUNTER (OUTPATIENT)
Dept: RADIOLOGY | Facility: MEDICAL CENTER | Age: 74
End: 2024-06-04
Attending: PHYSICIAN ASSISTANT
Payer: MEDICARE

## 2024-06-04 DIAGNOSIS — M54.17 LUMBOSACRAL RADICULOPATHY: ICD-10-CM

## 2024-06-04 PROCEDURE — 73720 MRI LWR EXTREMITY W/O&W/DYE: CPT

## 2024-06-04 PROCEDURE — A9579 GAD-BASE MR CONTRAST NOS,1ML: HCPCS | Performed by: PHYSICIAN ASSISTANT

## 2024-06-04 PROCEDURE — 700117 HCHG RX CONTRAST REV CODE 255: Performed by: PHYSICIAN ASSISTANT

## 2024-06-04 RX ADMIN — GADOTERIDOL 20 ML: 279.3 INJECTION, SOLUTION INTRAVENOUS at 18:42

## (undated) DEVICE — BLADE SAGITTAL SAW 9.4MM X 25.5MM X .4MM FINE TOOTH (1/EA)

## (undated) DEVICE — LACTATED RINGERS INJ 1000 ML - (14EA/CA 60CA/PF)

## (undated) DEVICE — SUTURE 3-0 VICRYL PLUSPS-2 - 18 INCH (36/BX)

## (undated) DEVICE — SODIUM CHL IRRIGATION 0.9% 1000ML (12EA/CA)

## (undated) DEVICE — WATER IRRIGATION STERILE 1000ML (12EA/CA)

## (undated) DEVICE — SENSOR SPO2 NEO LNCS ADHESIVE (20/BX) SEE USER NOTES

## (undated) DEVICE — BAG SPONGE COUNT 10.25 X 32 - BLUE (250/CA)

## (undated) DEVICE — BANDAGE STERILE 3 IN X 75 IN (12EA/BX 8BX/CA)

## (undated) DEVICE — PAD PREP 24 X 48 CUFFED - (100/CA)

## (undated) DEVICE — SUTURE 4-0 ETHILON FS-2 18 (36PK/BX)"

## (undated) DEVICE — CANISTER SUCTION RIGID RED 1500CC (40EA/CA)

## (undated) DEVICE — BOOT WALKING AIR MAX LARGE

## (undated) DEVICE — GLOVE, LITE (PAIR)

## (undated) DEVICE — PROTECTOR ULNA NERVE - (36PR/CA)

## (undated) DEVICE — HEAD HOLDER JUNIOR/ADULT

## (undated) DEVICE — ELECTRODE DUAL RETURN W/ CORD - (50/PK)

## (undated) DEVICE — GLOVE SZ 7.5 LF PROTEXIS (50PR/BX)

## (undated) DEVICE — SLEEVE VASO CALF MED - (10PR/CA)

## (undated) DEVICE — DRAPE STRLE REG TOWEL 18X24 - (10/BX 4BX/CA)"

## (undated) DEVICE — CHLORAPREP 26 ML APPLICATOR - ORANGE TINT(25/CA)

## (undated) DEVICE — MASK ANESTHESIA ADULT  - (100/CA)

## (undated) DEVICE — KIT ANESTHESIA W/CIRCUIT & 3/LT BAG W/FILTER (20EA/CA)

## (undated) DEVICE — SUTURE GENERAL

## (undated) DEVICE — TUBE CONNECTING SUCTION - CLEAR PLASTIC STERILE 72 IN (50EA/CA)

## (undated) DEVICE — SUTURE 5-0 ETHILON FS-2 18 (12PK/BX)"

## (undated) DEVICE — WRAP CO-FLEX 4IN X 5YD STERIL - SELF-ADHERENT (18/CA)

## (undated) DEVICE — GLOVE BIOGEL ECLIPSE PF LATEX SIZE 8.0  (50PR/BX)

## (undated) DEVICE — SYRINGE 10 ML CONTROL LL (25EA/BX 4BX/CA)

## (undated) DEVICE — HUMID-VENT HEAT AND MOISTURE EXCHANGE- (50/BX)

## (undated) DEVICE — NEPTUNE 4 PORT MANIFOLD - (20/PK)

## (undated) DEVICE — NEEDLE NON SAFETY 25 GA X 1 1/2 IN HYPO (100EA/BX)

## (undated) DEVICE — PACK LOWER EXTREMITY - (2/CA)

## (undated) DEVICE — WRAP SELF ADHERING 3 IN X 5YDS NON STERILE TAN (1/EA)

## (undated) DEVICE — ELECTRODE 850 FOAM ADHESIVE - HYDROGEL RADIOTRNSPRNT (50/PK)

## (undated) DEVICE — TOWEL STOP TIMEOUT SAFETY FLAG (40EA/CA)

## (undated) DEVICE — SUCTION INSTRUMENT YANKAUER BULBOUS TIP W/O VENT (50EA/CA)

## (undated) DEVICE — GOWN WARMING STANDARD FLEX - (30/CA)

## (undated) DEVICE — SUTURE 4-0 VICRYL PLUS PC-3 18 (12PK/BX)"